# Patient Record
Sex: FEMALE | Race: WHITE | Employment: PART TIME | URBAN - METROPOLITAN AREA
[De-identification: names, ages, dates, MRNs, and addresses within clinical notes are randomized per-mention and may not be internally consistent; named-entity substitution may affect disease eponyms.]

---

## 2017-07-24 ENCOUNTER — HOSPITAL ENCOUNTER (OUTPATIENT)
Age: 37
Discharge: HOME | End: 2017-07-24
Payer: COMMERCIAL

## 2017-07-24 ENCOUNTER — APPOINTMENT (OUTPATIENT)
Dept: GENERAL RADIOLOGY | Age: 37
End: 2017-07-24
Attending: NURSE PRACTITIONER
Payer: COMMERCIAL

## 2017-07-24 VITALS
RESPIRATION RATE: 16 BRPM | OXYGEN SATURATION: 98 % | DIASTOLIC BLOOD PRESSURE: 72 MMHG | HEART RATE: 89 BPM | SYSTOLIC BLOOD PRESSURE: 129 MMHG | TEMPERATURE: 98 F | WEIGHT: 220 LBS | BODY MASS INDEX: 35.36 KG/M2 | HEIGHT: 66 IN

## 2017-07-24 DIAGNOSIS — S66.912A WRIST STRAIN, LEFT, INITIAL ENCOUNTER: Primary | ICD-10-CM

## 2017-07-24 PROCEDURE — 99213 OFFICE O/P EST LOW 20 MIN: CPT

## 2017-07-24 PROCEDURE — 73110 X-RAY EXAM OF WRIST: CPT | Performed by: NURSE PRACTITIONER

## 2017-07-24 NOTE — ED PROVIDER NOTES
Patient presents with:  Wrist Pain      HPI:     Jerri Khan is a 43year old female who presents today with a chief complaint of pain in the left wrist for the past few weeks.   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 No snuffbox tenderness.   Point Tenderness: No    BP (!) 132/104   Pulse 89   Temp 98.4 °F (36.9 °C) (Oral)   Resp 16   Ht 167.6 cm (5' 6\")   Wt 99.8 kg   LMP 07/05/2017 (Exact Date)   SpO2 98%   BMI 35.51 kg/m²   GENERAL: well developed, well n discharge instructions for your condition today.     Follow Up with:  Mckenzie Montez MD  [address] [address]  [phone]    Schedule an appointment as soon as possible for a visit in 2 days      Chrissie Reza MD [place]

## 2017-07-24 NOTE — ED INITIAL ASSESSMENT (HPI)
PATIENT ARRIVED AMBULATORY TO ROOM B C/O LEFT WRIST AND LEFT HAND PAIN X2 WEEKS. PAIN RADIATES UP THE LEFT FOREARM. PATIENT DENIES ACUTE INJURY.  PATIENT STATES \"MY WRIST HAS BEEN MESSED UP FOR YEARS WHEN I MOVE IT I CAN HEAR CRACKING\" +TINGLING TO FINGER

## 2017-08-22 ENCOUNTER — HOSPITAL ENCOUNTER (OUTPATIENT)
Age: 37
Discharge: HOME | End: 2017-08-22
Attending: EMERGENCY MEDICINE
Payer: COMMERCIAL

## 2017-08-22 VITALS
OXYGEN SATURATION: 99 % | SYSTOLIC BLOOD PRESSURE: 142 MMHG | RESPIRATION RATE: 16 BRPM | BODY MASS INDEX: 38.57 KG/M2 | HEART RATE: 79 BPM | HEIGHT: 66 IN | DIASTOLIC BLOOD PRESSURE: 86 MMHG | TEMPERATURE: 98 F | WEIGHT: 240 LBS

## 2017-08-22 DIAGNOSIS — H61.21 IMPACTED CERUMEN OF RIGHT EAR: Primary | ICD-10-CM

## 2017-08-22 PROCEDURE — 69209 REMOVE IMPACTED EAR WAX UNI: CPT

## 2017-08-22 PROCEDURE — 99212 OFFICE O/P EST SF 10 MIN: CPT

## 2017-08-22 PROCEDURE — 99213 OFFICE O/P EST LOW 20 MIN: CPT

## 2017-08-22 NOTE — ED PROVIDER NOTES
Patient Seen in: [facility]    History   Patient presents with:  Ear Problem Pain (neurosensory)    Stated Complaint: Rt ear pain    HPI    Patient is a 25-year-old female with a history of cerumen impactions complains of Smokeless tobacco: Never Used                      Alcohol use: No               Comment: None. Review of Systems    Positive for stated complaint: Rt ear pain  Other systems are as noted in HPI.   Constitutional and vital sign

## 2017-08-22 NOTE — ED INITIAL ASSESSMENT (HPI)
C/O RIGHT EAR DISCOMFORT X 1 WEEK. PATIENT REPORTS HISTORY OF EAR WAX BUILDUP IN THE PAST. DENIES PAIN BUT STATES SHE IS \"UNCOMFORTABLE\" AND HAS MUFFLED HEARING.

## 2018-06-22 ENCOUNTER — APPOINTMENT (OUTPATIENT)
Dept: GENERAL RADIOLOGY | Age: 38
End: 2018-06-22
Attending: NURSE PRACTITIONER
Payer: MEDICAID

## 2018-06-22 ENCOUNTER — HOSPITAL ENCOUNTER (OUTPATIENT)
Age: 38
Discharge: HOME | End: 2018-06-22
Payer: MEDICAID

## 2018-06-22 VITALS
BODY MASS INDEX: 35.36 KG/M2 | OXYGEN SATURATION: 100 % | DIASTOLIC BLOOD PRESSURE: 99 MMHG | HEART RATE: 81 BPM | TEMPERATURE: 97 F | RESPIRATION RATE: 20 BRPM | HEIGHT: 66 IN | WEIGHT: 220 LBS | SYSTOLIC BLOOD PRESSURE: 141 MMHG

## 2018-06-22 DIAGNOSIS — J40 BRONCHITIS: Primary | ICD-10-CM

## 2018-06-22 PROCEDURE — 71046 X-RAY EXAM CHEST 2 VIEWS: CPT | Performed by: NURSE PRACTITIONER

## 2018-06-22 PROCEDURE — 99214 OFFICE O/P EST MOD 30 MIN: CPT

## 2018-06-22 PROCEDURE — 99213 OFFICE O/P EST LOW 20 MIN: CPT

## 2018-06-22 RX ORDER — ALBUTEROL SULFATE 90 UG/1
2 AEROSOL, METERED RESPIRATORY (INHALATION) EVERY 4 HOURS PRN
Qty: 1 INHALER | Refills: 0 | Status: SHIPPED | OUTPATIENT
Start: 2018-06-22 | End: 2018-07-22

## 2018-06-22 RX ORDER — AZITHROMYCIN 250 MG/1
TABLET, FILM COATED ORAL
Qty: 1 PACKAGE | Refills: 0 | Status: SHIPPED | OUTPATIENT
Start: 2018-06-22 | End: 2018-06-27

## 2018-06-22 RX ORDER — AZITHROMYCIN 250 MG/1
TABLET, FILM COATED ORAL
Qty: 1 PACKAGE | Refills: 0 | Status: SHIPPED | OUTPATIENT
Start: 2018-06-22 | End: 2018-06-22

## 2018-06-22 RX ORDER — PREDNISONE 20 MG/1
40 TABLET ORAL DAILY
Qty: 10 TABLET | Refills: 0 | Status: SHIPPED | OUTPATIENT
Start: 2018-06-22 | End: 2018-06-27

## 2018-06-22 NOTE — ED PROVIDER NOTES
Patient presents with:  Cough/URI      HPI:     Sandrita Mcnulty is a 42year old female with no significant past medical history presents with cough and chest tightness. Pt reports symptoms started last night but worsened today.  Reports she only has chest Ann Miguel MD on 6/22/2018 at 12:26     Approved by (CST): Ann Miguel MD on 6/22/2018 at 12:28          Xr Chest Pa + Lat Chest (cpt=71046)    Result Date: 6/22/2018  CONCLUSION:  1. No focal airspace disease or pleural effusion.  2. Mild left per instructions for your condition today.     Follow Up with:  Keven Sheriff MD  [address] [address]  [phone]

## 2018-06-25 ENCOUNTER — OFFICE VISIT (OUTPATIENT)
Dept: INTERNAL MEDICINE CLINIC | Facility: CLINIC | Age: 38
End: 2018-06-25

## 2018-06-25 VITALS
HEART RATE: 80 BPM | SYSTOLIC BLOOD PRESSURE: 152 MMHG | BODY MASS INDEX: 44.74 KG/M2 | RESPIRATION RATE: 20 BRPM | DIASTOLIC BLOOD PRESSURE: 91 MMHG | WEIGHT: 278.38 LBS | OXYGEN SATURATION: 97 % | HEIGHT: 66 IN | TEMPERATURE: 98 F

## 2018-06-25 DIAGNOSIS — F41.9 ANXIETY: ICD-10-CM

## 2018-06-25 DIAGNOSIS — J40 BRONCHITIS: ICD-10-CM

## 2018-06-25 PROCEDURE — 99214 OFFICE O/P EST MOD 30 MIN: CPT | Performed by: INTERNAL MEDICINE

## 2018-06-25 PROCEDURE — 99212 OFFICE O/P EST SF 10 MIN: CPT | Performed by: INTERNAL MEDICINE

## 2018-06-25 RX ORDER — LISINOPRIL 5 MG/1
5 TABLET ORAL DAILY
Qty: 30 TABLET | Refills: 1 | Status: SHIPPED | OUTPATIENT
Start: 2018-06-25 | End: 2019-08-27

## 2018-06-25 RX ORDER — BLOOD-GLUCOSE METER
EACH MISCELLANEOUS
Qty: 1 KIT | Refills: 0 | Status: SHIPPED | OUTPATIENT
Start: 2018-06-25 | End: 2021-12-16 | Stop reason: ALTCHOICE

## 2018-06-25 RX ORDER — LANCETS 33 GAUGE
EACH MISCELLANEOUS
Qty: 100 EACH | Refills: 6 | Status: SHIPPED | OUTPATIENT
Start: 2018-06-25 | End: 2021-12-16 | Stop reason: ALTCHOICE

## 2018-06-25 RX ORDER — PREDNISONE 10 MG/1
TABLET ORAL
Qty: 9 TABLET | Refills: 0 | Status: SHIPPED | OUTPATIENT
Start: 2018-06-25 | End: 2019-08-27

## 2018-06-26 ENCOUNTER — TELEPHONE (OUTPATIENT)
Dept: INTERNAL MEDICINE CLINIC | Facility: CLINIC | Age: 38
End: 2018-06-26

## 2018-06-26 RX ORDER — CLONAZEPAM 0.5 MG/1
0.5 TABLET ORAL NIGHTLY PRN
Qty: 30 TABLET | Refills: 0 | OUTPATIENT
Start: 2018-06-26 | End: 2019-08-27

## 2018-06-26 NOTE — PROGRESS NOTES
HPI:    Patient ID: Richie George is a 42year old female.   Presents for follow-up of bronchitis    HPI  Patient reports that several days ago she was seen at [facility] care center was diagnosed with bronchitis, given prednisone, Zithromax, and albutero and psychiatric symptoms          Current Outpatient Prescriptions:  lisinopril 5 MG Oral Tab Take 1 tablet (5 mg total) by mouth daily.  Disp: 30 tablet Rfl: 1   Blood Glucose Monitoring Suppl (ONETOUCH ULTRA 2) w/Device Does not apply Kit Check  Blood sug Exam    Constitutional: She is oriented to person, place, and time and obese. She appears well-developed and well-nourished. No distress. HENT:   Head: Normocephalic and atraumatic.    Eyes: Conjunctivae and EOM are normal. Pupils are equal, round, and re

## 2018-07-31 ENCOUNTER — LAB ENCOUNTER (OUTPATIENT)
Dept: LAB | Age: 38
End: 2018-07-31
Attending: INTERNAL MEDICINE
Payer: MEDICAID

## 2018-07-31 DIAGNOSIS — F41.9 ANXIETY: ICD-10-CM

## 2018-07-31 DIAGNOSIS — J40 BRONCHITIS: ICD-10-CM

## 2018-07-31 LAB
ALBUMIN SERPL BCP-MCNC: 3.5 G/DL (ref 3.5–4.8)
ALBUMIN/GLOB SERPL: 1 {RATIO} (ref 1–2)
ALP SERPL-CCNC: 91 U/L (ref 32–100)
ALT SERPL-CCNC: 41 U/L (ref 14–54)
ANION GAP SERPL CALC-SCNC: 6 MMOL/L (ref 0–18)
AST SERPL-CCNC: 30 U/L (ref 15–41)
BASOPHILS # BLD: 0.1 K/UL (ref 0–0.2)
BASOPHILS NFR BLD: 1 %
BILIRUB SERPL-MCNC: 0.9 MG/DL (ref 0.3–1.2)
BUN SERPL-MCNC: 8 MG/DL (ref 8–20)
BUN/CREAT SERPL: 11.6 (ref 10–20)
CALCIUM SERPL-MCNC: 8.9 MG/DL (ref 8.5–10.5)
CHLORIDE SERPL-SCNC: 105 MMOL/L (ref 95–110)
CHOLEST SERPL-MCNC: 180 MG/DL (ref 110–200)
CO2 SERPL-SCNC: 27 MMOL/L (ref 22–32)
CREAT SERPL-MCNC: 0.69 MG/DL (ref 0.5–1.5)
EOSINOPHIL # BLD: 0.1 K/UL (ref 0–0.7)
EOSINOPHIL NFR BLD: 1 %
ERYTHROCYTE [DISTWIDTH] IN BLOOD BY AUTOMATED COUNT: 14.3 % (ref 11–15)
GLOBULIN PLAS-MCNC: 3.6 G/DL (ref 2.5–3.7)
GLUCOSE SERPL-MCNC: 106 MG/DL (ref 70–99)
HCT VFR BLD AUTO: 40.4 % (ref 35–48)
HDLC SERPL-MCNC: 38 MG/DL
HGB BLD-MCNC: 13.5 G/DL (ref 12–16)
LDLC SERPL CALC-MCNC: 119 MG/DL (ref 0–99)
LYMPHOCYTES # BLD: 2.7 K/UL (ref 1–4)
LYMPHOCYTES NFR BLD: 23 %
MCH RBC QN AUTO: 27.4 PG (ref 27–32)
MCHC RBC AUTO-ENTMCNC: 33.3 G/DL (ref 32–37)
MCV RBC AUTO: 82.2 FL (ref 80–100)
MONOCYTES # BLD: 0.6 K/UL (ref 0–1)
MONOCYTES NFR BLD: 5 %
NEUTROPHILS # BLD AUTO: 8.6 K/UL (ref 1.8–7.7)
NEUTROPHILS NFR BLD: 71 %
NONHDLC SERPL-MCNC: 142 MG/DL
OSMOLALITY UR CALC.SUM OF ELEC: 285 MOSM/KG (ref 275–295)
PATIENT FASTING: YES
PLATELET # BLD AUTO: 329 K/UL (ref 140–400)
PMV BLD AUTO: 8.1 FL (ref 7.4–10.3)
POTASSIUM SERPL-SCNC: 4.2 MMOL/L (ref 3.3–5.1)
PROT SERPL-MCNC: 7.1 G/DL (ref 5.9–8.4)
RBC # BLD AUTO: 4.91 M/UL (ref 3.7–5.4)
SODIUM SERPL-SCNC: 138 MMOL/L (ref 136–144)
TRIGL SERPL-MCNC: 116 MG/DL (ref 1–149)
TSH SERPL-ACNC: 2.41 UIU/ML (ref 0.45–5.33)
WBC # BLD AUTO: 12.2 K/UL (ref 4–11)

## 2018-07-31 PROCEDURE — 84443 ASSAY THYROID STIM HORMONE: CPT

## 2018-07-31 PROCEDURE — 80061 LIPID PANEL: CPT

## 2018-07-31 PROCEDURE — 85025 COMPLETE CBC W/AUTO DIFF WBC: CPT

## 2018-07-31 PROCEDURE — 36415 COLL VENOUS BLD VENIPUNCTURE: CPT

## 2018-07-31 PROCEDURE — 80053 COMPREHEN METABOLIC PANEL: CPT

## 2018-07-31 PROCEDURE — 83036 HEMOGLOBIN GLYCOSYLATED A1C: CPT

## 2018-08-01 LAB — HBA1C MFR BLD: 6.2 % (ref 4–6)

## 2018-08-04 ENCOUNTER — TELEPHONE (OUTPATIENT)
Dept: INTERNAL MEDICINE CLINIC | Facility: CLINIC | Age: 38
End: 2018-08-04

## 2018-08-04 NOTE — TELEPHONE ENCOUNTER
Reviewed with patient recent lab test results, slightly elevated hemoglobin A1c 6.2, strongly encourage her to follow low carbohydrate diet weight down, regular physical activities encouraged.   Normal kidney liver function tests, elevated white blood cell

## 2018-08-31 ENCOUNTER — CHARTING TRANS (OUTPATIENT)
Dept: OTHER | Age: 38
End: 2018-08-31

## 2018-08-31 ASSESSMENT — PAIN SCALES - GENERAL: PAINLEVEL_OUTOF10: 0

## 2018-12-08 VITALS
BODY MASS INDEX: 46.02 KG/M2 | SYSTOLIC BLOOD PRESSURE: 118 MMHG | HEART RATE: 86 BPM | WEIGHT: 276.25 LBS | OXYGEN SATURATION: 97 % | RESPIRATION RATE: 18 BRPM | DIASTOLIC BLOOD PRESSURE: 66 MMHG | HEIGHT: 65 IN

## 2019-03-11 ENCOUNTER — OFFICE VISIT (OUTPATIENT)
Dept: FAMILY MEDICINE | Age: 39
End: 2019-03-11

## 2019-03-11 ENCOUNTER — TELEPHONE (OUTPATIENT)
Dept: SCHEDULING | Age: 39
End: 2019-03-11

## 2019-03-11 DIAGNOSIS — J02.9 SORE THROAT: Primary | ICD-10-CM

## 2019-03-11 DIAGNOSIS — J06.9 ACUTE URI: ICD-10-CM

## 2019-03-11 PROBLEM — I10 ESSENTIAL HYPERTENSION: Status: ACTIVE | Noted: 2019-03-11

## 2019-03-11 PROBLEM — E66.01 MORBID OBESITY WITH BMI OF 45.0-49.9, ADULT (CMD): Status: ACTIVE | Noted: 2019-03-11

## 2019-03-11 PROBLEM — R73.03 PREDIABETES: Status: ACTIVE | Noted: 2019-03-11

## 2019-03-11 LAB
INTERNAL PROCEDURAL CONTROLS ACCEPTABLE: YES
S PYO AG THROAT QL IA.RAPID: NEGATIVE

## 2019-03-11 PROCEDURE — 87880 STREP A ASSAY W/OPTIC: CPT | Performed by: PHYSICIAN ASSISTANT

## 2019-03-11 PROCEDURE — 3074F SYST BP LT 130 MM HG: CPT | Performed by: PHYSICIAN ASSISTANT

## 2019-03-11 PROCEDURE — 99213 OFFICE O/P EST LOW 20 MIN: CPT | Performed by: PHYSICIAN ASSISTANT

## 2019-03-11 PROCEDURE — 3079F DIAST BP 80-89 MM HG: CPT | Performed by: PHYSICIAN ASSISTANT

## 2019-03-11 RX ORDER — LISINOPRIL 5 MG/1
5 TABLET ORAL DAILY
COMMUNITY

## 2019-03-11 ASSESSMENT — ENCOUNTER SYMPTOMS
SORE THROAT: 1
COUGH: 1
SHORTNESS OF BREATH: 0

## 2019-03-11 ASSESSMENT — PAIN SCALES - GENERAL: PAINLEVEL: 7-8

## 2019-08-27 ENCOUNTER — OFFICE VISIT (OUTPATIENT)
Dept: INTERNAL MEDICINE CLINIC | Facility: CLINIC | Age: 39
End: 2019-08-27
Payer: MEDICAID

## 2019-08-27 VITALS
SYSTOLIC BLOOD PRESSURE: 146 MMHG | HEIGHT: 66 IN | HEART RATE: 87 BPM | DIASTOLIC BLOOD PRESSURE: 79 MMHG | BODY MASS INDEX: 43.39 KG/M2 | RESPIRATION RATE: 22 BRPM | WEIGHT: 270 LBS

## 2019-08-27 DIAGNOSIS — R73.03 PREDIABETES: ICD-10-CM

## 2019-08-27 DIAGNOSIS — F41.9 ANXIETY: ICD-10-CM

## 2019-08-27 DIAGNOSIS — I10 ESSENTIAL HYPERTENSION: Primary | ICD-10-CM

## 2019-08-27 PROCEDURE — 99214 OFFICE O/P EST MOD 30 MIN: CPT | Performed by: INTERNAL MEDICINE

## 2019-08-27 RX ORDER — ESCITALOPRAM OXALATE 10 MG/1
10 TABLET ORAL DAILY
Qty: 30 TABLET | Refills: 1 | Status: SHIPPED | OUTPATIENT
Start: 2019-08-27 | End: 2020-02-29

## 2019-08-27 RX ORDER — LABETALOL 200 MG/1
200 TABLET, FILM COATED ORAL 2 TIMES DAILY
Qty: 180 TABLET | Refills: 1 | Status: SHIPPED | OUTPATIENT
Start: 2019-08-27 | End: 2019-11-13

## 2019-08-27 RX ORDER — ALPRAZOLAM 0.5 MG/1
0.5 TABLET ORAL NIGHTLY PRN
Qty: 30 TABLET | Refills: 0 | Status: SHIPPED | OUTPATIENT
Start: 2019-08-27 | End: 2019-11-13

## 2019-08-27 NOTE — PROGRESS NOTES
HPI:    Patient ID: Bear Molina is a 34year old female. Presents for follow-up of hypertension, anxiety    HPI  Patient states that she has been taking milligrams twice a day most of this regularly, she missed dose less like.   Lately she reports m symptoms  Neurological:  Negative for gait disturbance, paresthesias  Psychiatric:  Negative for inappropriate interaction and psychiatric symptoms        Current Outpatient Medications:  ALPRAZolam (XANAX) 0.5 MG Oral Tab Take 1 tablet (0.5 mg total) by m rhythm and normal heart sounds. No murmur heard. Edema not present. Pulmonary/Chest: Effort normal. No respiratory distress. She has no wheezes. She has no rales. Abdominal: Soft. She exhibits no distension. There is no hepatosplenomegaly.  There is

## 2019-09-01 PROBLEM — E66.813 CLASS 3 SEVERE OBESITY DUE TO EXCESS CALORIES WITHOUT SERIOUS COMORBIDITY IN ADULT: Status: ACTIVE | Noted: 2019-09-01

## 2019-09-01 PROBLEM — E66.01 CLASS 3 SEVERE OBESITY DUE TO EXCESS CALORIES WITHOUT SERIOUS COMORBIDITY IN ADULT (HCC): Status: ACTIVE | Noted: 2019-09-01

## 2019-09-01 PROBLEM — E66.813 CLASS 3 SEVERE OBESITY DUE TO EXCESS CALORIES WITHOUT SERIOUS COMORBIDITY IN ADULT (HCC): Status: ACTIVE | Noted: 2019-09-01

## 2019-09-01 PROBLEM — F41.9 ANXIETY: Status: ACTIVE | Noted: 2019-09-01

## 2019-10-26 ENCOUNTER — LAB ENCOUNTER (OUTPATIENT)
Dept: LAB | Age: 39
End: 2019-10-26
Attending: INTERNAL MEDICINE
Payer: MEDICAID

## 2019-10-26 DIAGNOSIS — R73.03 PREDIABETES: ICD-10-CM

## 2019-10-26 DIAGNOSIS — I10 ESSENTIAL HYPERTENSION: ICD-10-CM

## 2019-10-26 PROCEDURE — 85025 COMPLETE CBC W/AUTO DIFF WBC: CPT

## 2019-10-26 PROCEDURE — 36415 COLL VENOUS BLD VENIPUNCTURE: CPT

## 2019-10-26 PROCEDURE — 84439 ASSAY OF FREE THYROXINE: CPT

## 2019-10-26 PROCEDURE — 84443 ASSAY THYROID STIM HORMONE: CPT

## 2019-10-26 PROCEDURE — 80061 LIPID PANEL: CPT

## 2019-10-26 PROCEDURE — 80053 COMPREHEN METABOLIC PANEL: CPT

## 2019-10-26 PROCEDURE — 83036 HEMOGLOBIN GLYCOSYLATED A1C: CPT

## 2019-10-31 ENCOUNTER — TELEPHONE (OUTPATIENT)
Dept: INTERNAL MEDICINE CLINIC | Facility: CLINIC | Age: 39
End: 2019-10-31

## 2019-11-12 NOTE — TELEPHONE ENCOUNTER
Patient returned call. She has an appointment with Dr. Nora Russell tomorrow 11/13 and will discuss everything in office.

## 2019-11-13 ENCOUNTER — OFFICE VISIT (OUTPATIENT)
Dept: INTERNAL MEDICINE CLINIC | Facility: CLINIC | Age: 39
End: 2019-11-13

## 2019-11-13 VITALS
TEMPERATURE: 98 F | DIASTOLIC BLOOD PRESSURE: 98 MMHG | WEIGHT: 268.38 LBS | BODY MASS INDEX: 43.13 KG/M2 | HEART RATE: 80 BPM | HEIGHT: 66 IN | SYSTOLIC BLOOD PRESSURE: 158 MMHG | RESPIRATION RATE: 20 BRPM

## 2019-11-13 DIAGNOSIS — F41.9 ANXIETY: ICD-10-CM

## 2019-11-13 DIAGNOSIS — E03.8 OTHER SPECIFIED HYPOTHYROIDISM: Primary | ICD-10-CM

## 2019-11-13 PROCEDURE — 99214 OFFICE O/P EST MOD 30 MIN: CPT | Performed by: INTERNAL MEDICINE

## 2019-11-13 RX ORDER — ALPRAZOLAM 0.5 MG/1
0.5 TABLET ORAL NIGHTLY PRN
Qty: 30 TABLET | Refills: 1 | Status: SHIPPED | OUTPATIENT
Start: 2019-11-13 | End: 2020-02-29

## 2019-11-13 RX ORDER — ESCITALOPRAM OXALATE 20 MG/1
20 TABLET ORAL DAILY
Qty: 30 TABLET | Refills: 1 | Status: SHIPPED | OUTPATIENT
Start: 2019-11-13 | End: 2021-06-08 | Stop reason: ALTCHOICE

## 2019-11-13 RX ORDER — LEVOTHYROXINE SODIUM 0.03 MG/1
25 TABLET ORAL
Qty: 30 TABLET | Refills: 1 | Status: SHIPPED | OUTPATIENT
Start: 2019-11-13 | End: 2020-02-13

## 2019-11-13 RX ORDER — LISINOPRIL 5 MG/1
5 TABLET ORAL DAILY
Qty: 30 TABLET | Refills: 3 | Status: SHIPPED | OUTPATIENT
Start: 2019-11-13 | End: 2020-02-12

## 2019-11-13 RX ORDER — LABETALOL 200 MG/1
200 TABLET, FILM COATED ORAL 2 TIMES DAILY
Qty: 180 TABLET | Refills: 1 | Status: SHIPPED | OUTPATIENT
Start: 2019-11-13 | End: 2020-08-30

## 2019-11-13 RX ORDER — ESCITALOPRAM OXALATE 10 MG/1
10 TABLET ORAL DAILY
Qty: 30 TABLET | Refills: 1 | Status: CANCELLED | OUTPATIENT
Start: 2019-11-13

## 2019-11-15 NOTE — PROGRESS NOTES
HPI:    Patient ID: Kamryn Oneil is a 37year old female. Presents for follow-up on depression, hypertension, abnormal labs.     HPI  Patient was started on Lexapro 10 mg daily about 2 months ago reports that she is feeling somewhat better, less depr Does not apply Kit Check  Blood sugar daily 1 kit 0   • Glucose Blood (ONETOUCH ULTRA BLUE) In Vitro Strip Check  Blood  Sugar daily 100 strip 6   • ONETOUCH DELICA LANCETS 75Z Does not apply Misc cjeck  Blood sugar daily 100 each 6   • ibuprofen (MOTRIN) lisinopril 5 mg daily, continue labetalol 200 mg twice a day follow-up in 6 weeks  Anxiety patient will increase Lexapro take 20 mg daily, alprazolam as needed patient states that barely uses it follow-up in 6 to 8 weeks  Prior diabetes referred patient to

## 2020-01-04 ENCOUNTER — HOSPITAL ENCOUNTER (OUTPATIENT)
Age: 40
Discharge: HOME | End: 2020-01-04
Attending: EMERGENCY MEDICINE
Payer: MEDICAID

## 2020-01-04 VITALS
SYSTOLIC BLOOD PRESSURE: 130 MMHG | OXYGEN SATURATION: 98 % | DIASTOLIC BLOOD PRESSURE: 76 MMHG | TEMPERATURE: 98 F | BODY MASS INDEX: 42.75 KG/M2 | WEIGHT: 266 LBS | RESPIRATION RATE: 18 BRPM | HEART RATE: 71 BPM | HEIGHT: 66 IN

## 2020-01-04 DIAGNOSIS — J40 BRONCHITIS: Primary | ICD-10-CM

## 2020-01-04 PROCEDURE — 99214 OFFICE O/P EST MOD 30 MIN: CPT

## 2020-01-04 PROCEDURE — 99213 OFFICE O/P EST LOW 20 MIN: CPT

## 2020-01-04 NOTE — ED INITIAL ASSESSMENT (HPI)
PATIENT REPORTS URI SYMPTOMS FOR THE PAST 4 DAYS.  + COUGH DENIES FEVERS. STATES COUGH IS PRODUCTIVE. REPORTS HX OF BRONCHITIS.

## 2020-01-04 NOTE — ED PROVIDER NOTES
Patient Seen in: [facility]      History   Patient presents with:  Cough/URI    Stated Complaint: cough    HPI    13-year-old female presents for evaluation of cough.   Patient reports for the past 4 days she has had rhino Mouth: Mucous membranes are moist.   Eyes:      Conjunctiva/sclera: Conjunctivae normal.   Neck:      Musculoskeletal: Normal range of motion and neck supple. No neck rigidity. Cardiovascular:      Rate and Rhythm: Normal rate.    Pulmonary:      Effort:

## 2020-02-12 RX ORDER — LISINOPRIL 5 MG/1
TABLET ORAL
Qty: 90 TABLET | Refills: 1 | Status: SHIPPED | OUTPATIENT
Start: 2020-02-12 | End: 2021-05-19

## 2020-02-12 NOTE — TELEPHONE ENCOUNTER
Hypertensive Medications  Refill passed per [facility], [facility] protocol.     Protocol Criteria:  · Appointment scheduled in the past 6 months or in the next 3 months  · BMP or CMP in the past 12 months  · Creatinine result < 2  Recent Outpatient Visits

## 2020-02-12 NOTE — TELEPHONE ENCOUNTER
Hypothyroid Medications  Please review; protocol failed.     Protocol Criteria:  Appointment scheduled in the past 12 months or the next 3 months  TSH resulted in the past 12 months that is normal  Recent Outpatient Visits            3 months ago Other spec

## 2020-02-13 RX ORDER — LEVOTHYROXINE SODIUM 0.03 MG/1
TABLET ORAL
Qty: 30 TABLET | Refills: 0 | Status: SHIPPED | OUTPATIENT
Start: 2020-02-13 | End: 2020-02-29

## 2020-02-27 ENCOUNTER — APPOINTMENT (OUTPATIENT)
Dept: LAB | Age: 40
End: 2020-02-27
Attending: INTERNAL MEDICINE
Payer: MEDICAID

## 2020-02-27 DIAGNOSIS — E03.8 OTHER SPECIFIED HYPOTHYROIDISM: ICD-10-CM

## 2020-02-27 LAB
ALBUMIN SERPL-MCNC: 3.5 G/DL (ref 3.4–5)
ALBUMIN/GLOB SERPL: 0.9 {RATIO} (ref 1–2)
ALP LIVER SERPL-CCNC: 92 U/L (ref 37–98)
ALT SERPL-CCNC: 24 U/L (ref 13–56)
ANION GAP SERPL CALC-SCNC: 4 MMOL/L (ref 0–18)
AST SERPL-CCNC: 21 U/L (ref 15–37)
BILIRUB SERPL-MCNC: 0.9 MG/DL (ref 0.1–2)
BUN BLD-MCNC: 9 MG/DL (ref 7–18)
BUN/CREAT SERPL: 14.3 (ref 10–20)
CALCIUM BLD-MCNC: 8.7 MG/DL (ref 8.5–10.1)
CHLORIDE SERPL-SCNC: 106 MMOL/L (ref 98–112)
CO2 SERPL-SCNC: 29 MMOL/L (ref 21–32)
CREAT BLD-MCNC: 0.63 MG/DL (ref 0.55–1.02)
GLOBULIN PLAS-MCNC: 4.1 G/DL (ref 2.8–4.4)
GLUCOSE BLD-MCNC: 90 MG/DL (ref 70–99)
M PROTEIN MFR SERPL ELPH: 7.6 G/DL (ref 6.4–8.2)
OSMOLALITY SERPL CALC.SUM OF ELEC: 286 MOSM/KG (ref 275–295)
PATIENT FASTING Y/N/NP: YES
POTASSIUM SERPL-SCNC: 3.9 MMOL/L (ref 3.5–5.1)
SODIUM SERPL-SCNC: 139 MMOL/L (ref 136–145)
TSI SER-ACNC: 2.15 MIU/ML (ref 0.36–3.74)

## 2020-02-27 PROCEDURE — 36415 COLL VENOUS BLD VENIPUNCTURE: CPT

## 2020-02-27 PROCEDURE — 84443 ASSAY THYROID STIM HORMONE: CPT

## 2020-02-27 PROCEDURE — 80053 COMPREHEN METABOLIC PANEL: CPT

## 2020-02-29 ENCOUNTER — OFFICE VISIT (OUTPATIENT)
Dept: INTERNAL MEDICINE CLINIC | Facility: CLINIC | Age: 40
End: 2020-02-29
Payer: MEDICAID

## 2020-02-29 VITALS
HEIGHT: 66 IN | TEMPERATURE: 97 F | BODY MASS INDEX: 42.27 KG/M2 | WEIGHT: 263 LBS | HEART RATE: 77 BPM | DIASTOLIC BLOOD PRESSURE: 74 MMHG | SYSTOLIC BLOOD PRESSURE: 128 MMHG

## 2020-02-29 DIAGNOSIS — E03.8 OTHER SPECIFIED HYPOTHYROIDISM: Primary | ICD-10-CM

## 2020-02-29 DIAGNOSIS — I10 ESSENTIAL HYPERTENSION: ICD-10-CM

## 2020-02-29 DIAGNOSIS — R73.03 PREDIABETES: ICD-10-CM

## 2020-02-29 PROCEDURE — 99214 OFFICE O/P EST MOD 30 MIN: CPT | Performed by: INTERNAL MEDICINE

## 2020-02-29 RX ORDER — LEVOTHYROXINE SODIUM 0.03 MG/1
TABLET ORAL
Qty: 30 TABLET | Refills: 5 | Status: SHIPPED | OUTPATIENT
Start: 2020-02-29 | End: 2021-06-08 | Stop reason: ALTCHOICE

## 2020-02-29 RX ORDER — INSULIN LISPRO 100 [IU]/ML
INJECTION, SOLUTION INTRAVENOUS; SUBCUTANEOUS
COMMUNITY
Start: 2015-01-30 | End: 2020-08-30

## 2020-02-29 RX ORDER — ALPRAZOLAM 0.5 MG/1
0.5 TABLET ORAL NIGHTLY PRN
Qty: 30 TABLET | Refills: 1 | Status: SHIPPED | OUTPATIENT
Start: 2020-02-29 | End: 2020-05-04

## 2020-03-01 NOTE — PROGRESS NOTES
HPI:    Patient ID: Eunice Rose is a 37year old female. Presents for follow-up on hypertension, hypothyroidism, prediabetes, anxiety.     HPI  Patient reports that she is trying to make changes in her diet, eliminated pop, watches carbohydrate [place] Blood  Sugar daily 100 strip 6   • ONETOUCH DELICA LANCETS 60W Does not apply Misc cjeck  Blood sugar daily 100 each 6   • ibuprofen (MOTRIN) 600 MG Oral Tab Take 1 tablet by mouth every 8 (eight) hours as needed for Pain.  40 tablet 0   • Blood Pressure Mo To Free T4 [E]      Meds This.:  Requested Prescriptions     Signed Prescriptions Disp Refills   • Levothyroxine Sodium 25 MCG Oral Tab 30 tablet 5     Sig: TAKE ONE TABLET BY MOUTH BEFORE BREAKFAST   • ALPRAZolam (XANAX) 0.5 MG Oral Tab 30 tablet 1     Si

## 2020-05-04 RX ORDER — ALPRAZOLAM 0.5 MG/1
TABLET ORAL
Qty: 30 TABLET | Refills: 0 | Status: SHIPPED | OUTPATIENT
Start: 2020-05-04 | End: 2020-06-19

## 2020-06-19 ENCOUNTER — TELEPHONE (OUTPATIENT)
Dept: INTERNAL MEDICINE CLINIC | Facility: CLINIC | Age: 40
End: 2020-06-19

## 2020-06-19 RX ORDER — ALPRAZOLAM 0.5 MG/1
TABLET ORAL
Qty: 30 TABLET | Refills: 0 | Status: SHIPPED | OUTPATIENT
Start: 2020-06-19 | End: 2020-08-26

## 2020-06-19 NOTE — TELEPHONE ENCOUNTER
1st Attempt - Patient states she will complete blood work first and call back to schedule appointment with Dr. Dany Smith.

## 2020-06-19 NOTE — TELEPHONE ENCOUNTER
Please call patient I refilled her medication she is due for follow-up visit, blood test orders in computer

## 2020-06-20 ENCOUNTER — APPOINTMENT (OUTPATIENT)
Dept: LAB | Age: 40
End: 2020-06-20
Attending: INTERNAL MEDICINE
Payer: MEDICAID

## 2020-06-20 DIAGNOSIS — R73.03 PREDIABETES: ICD-10-CM

## 2020-06-20 DIAGNOSIS — E03.8 OTHER SPECIFIED HYPOTHYROIDISM: ICD-10-CM

## 2020-06-20 PROCEDURE — 36415 COLL VENOUS BLD VENIPUNCTURE: CPT

## 2020-06-20 PROCEDURE — 84443 ASSAY THYROID STIM HORMONE: CPT

## 2020-06-20 PROCEDURE — 80053 COMPREHEN METABOLIC PANEL: CPT

## 2020-06-20 PROCEDURE — 83036 HEMOGLOBIN GLYCOSYLATED A1C: CPT

## 2020-07-14 ENCOUNTER — TELEPHONE (OUTPATIENT)
Dept: INTERNAL MEDICINE CLINIC | Facility: CLINIC | Age: 40
End: 2020-07-14

## 2020-07-15 NOTE — TELEPHONE ENCOUNTER
Left message to pt. DR. Jason Villalta still seeing the last pt. Will be calling in 30 minutes for video visit.

## 2020-08-26 RX ORDER — ALPRAZOLAM 0.5 MG/1
TABLET ORAL
Qty: 30 TABLET | Refills: 0 | Status: SHIPPED | OUTPATIENT
Start: 2020-08-26 | End: 2020-10-07

## 2020-08-29 ENCOUNTER — OFFICE VISIT (OUTPATIENT)
Dept: INTERNAL MEDICINE CLINIC | Facility: CLINIC | Age: 40
End: 2020-08-29
Payer: MEDICAID

## 2020-08-29 VITALS
BODY MASS INDEX: 42.65 KG/M2 | HEART RATE: 76 BPM | SYSTOLIC BLOOD PRESSURE: 149 MMHG | HEIGHT: 66 IN | DIASTOLIC BLOOD PRESSURE: 100 MMHG | WEIGHT: 265.38 LBS

## 2020-08-29 DIAGNOSIS — R73.03 PREDIABETES: Primary | ICD-10-CM

## 2020-08-29 DIAGNOSIS — E03.8 OTHER SPECIFIED HYPOTHYROIDISM: ICD-10-CM

## 2020-08-29 PROCEDURE — 3077F SYST BP >= 140 MM HG: CPT | Performed by: INTERNAL MEDICINE

## 2020-08-29 PROCEDURE — 3080F DIAST BP >= 90 MM HG: CPT | Performed by: INTERNAL MEDICINE

## 2020-08-29 PROCEDURE — 99214 OFFICE O/P EST MOD 30 MIN: CPT | Performed by: INTERNAL MEDICINE

## 2020-08-29 PROCEDURE — 3008F BODY MASS INDEX DOCD: CPT | Performed by: INTERNAL MEDICINE

## 2020-08-29 RX ORDER — METOPROLOL SUCCINATE 50 MG/1
50 TABLET, EXTENDED RELEASE ORAL DAILY
Qty: 30 TABLET | Refills: 3 | Status: SHIPPED | OUTPATIENT
Start: 2020-08-29 | End: 2021-07-03

## 2020-08-31 NOTE — PROGRESS NOTES
HPI:    Patient ID: Deon Aiken is a 37year old female.   Presents for follow-up on hypertension  HPI  Patient reports that she has been feeling well overall, stressed out out of work on unemployment, looking for a new job, 1 interviews coming for g Glucose Blood (ONETOUCH ULTRA BLUE) In Vitro Strip Check  Blood  Sugar daily 100 strip 6   • ONETOUCH DELICA LANCETS 43H Does not apply Misc cjeck  Blood sugar daily 100 each 6   • ibuprofen (MOTRIN) 600 MG Oral Tab Take 1 tablet by mouth every 8 (eight) h of carbohydrates, regular physical activities encouraged, will monitor hemoglobin A1c every 3 to 6 months  Orders Placed This Encounter      Comp Metabolic Panel (14) [E]      TSH W Reflex To Free T4 [E]      Hemoglobin A1C [E]  Follow-up in 3 months after

## 2020-09-15 ENCOUNTER — LAB ENCOUNTER (OUTPATIENT)
Dept: LAB | Facility: HOSPITAL | Age: 40
End: 2020-09-15
Attending: OBSTETRICS & GYNECOLOGY
Payer: MEDICAID

## 2020-09-15 ENCOUNTER — OFFICE VISIT (OUTPATIENT)
Dept: OBGYN CLINIC | Facility: CLINIC | Age: 40
End: 2020-09-15
Payer: MEDICAID

## 2020-09-15 VITALS
DIASTOLIC BLOOD PRESSURE: 80 MMHG | BODY MASS INDEX: 43 KG/M2 | WEIGHT: 264 LBS | SYSTOLIC BLOOD PRESSURE: 135 MMHG | HEART RATE: 78 BPM

## 2020-09-15 DIAGNOSIS — E03.8 OTHER SPECIFIED HYPOTHYROIDISM: ICD-10-CM

## 2020-09-15 DIAGNOSIS — Z12.31 ENCOUNTER FOR SCREENING MAMMOGRAM FOR BREAST CANCER: ICD-10-CM

## 2020-09-15 DIAGNOSIS — Z12.4 SCREENING FOR MALIGNANT NEOPLASM OF CERVIX: ICD-10-CM

## 2020-09-15 DIAGNOSIS — Z01.419 ENCOUNTER FOR GYNECOLOGICAL EXAMINATION: Primary | ICD-10-CM

## 2020-09-15 DIAGNOSIS — Z11.3 SCREEN FOR STD (SEXUALLY TRANSMITTED DISEASE): ICD-10-CM

## 2020-09-15 DIAGNOSIS — R73.03 PREDIABETES: ICD-10-CM

## 2020-09-15 LAB
ALBUMIN SERPL-MCNC: 3.4 G/DL (ref 3.4–5)
ALBUMIN/GLOB SERPL: 0.8 {RATIO} (ref 1–2)
ALP LIVER SERPL-CCNC: 98 U/L (ref 37–98)
ALT SERPL-CCNC: 25 U/L (ref 13–56)
ANION GAP SERPL CALC-SCNC: 11 MMOL/L (ref 0–18)
AST SERPL-CCNC: 14 U/L (ref 15–37)
BILIRUB SERPL-MCNC: 0.8 MG/DL (ref 0.1–2)
BUN BLD-MCNC: 10 MG/DL (ref 7–18)
BUN/CREAT SERPL: 11.4 (ref 10–20)
CALCIUM BLD-MCNC: 9.3 MG/DL (ref 8.5–10.1)
CHLORIDE SERPL-SCNC: 104 MMOL/L (ref 98–112)
CO2 SERPL-SCNC: 24 MMOL/L (ref 21–32)
CREAT BLD-MCNC: 0.88 MG/DL (ref 0.55–1.02)
EST. AVERAGE GLUCOSE BLD GHB EST-MCNC: 126 MG/DL (ref 68–126)
GLOBULIN PLAS-MCNC: 4.5 G/DL (ref 2.8–4.4)
GLUCOSE BLD-MCNC: 137 MG/DL (ref 70–99)
HBA1C MFR BLD HPLC: 6 % (ref ?–5.7)
HBV SURFACE AG SER-ACNC: 0.14 [IU]/L
HBV SURFACE AG SERPL QL IA: NONREACTIVE
HCV AB SERPL QL IA: NONREACTIVE
M PROTEIN MFR SERPL ELPH: 7.9 G/DL (ref 6.4–8.2)
OSMOLALITY SERPL CALC.SUM OF ELEC: 289 MOSM/KG (ref 275–295)
PATIENT FASTING Y/N/NP: NO
POTASSIUM SERPL-SCNC: 3.4 MMOL/L (ref 3.5–5.1)
SODIUM SERPL-SCNC: 139 MMOL/L (ref 136–145)
T4 FREE SERPL-MCNC: 1 NG/DL (ref 0.8–1.7)
TSI SER-ACNC: 8.07 MIU/ML (ref 0.36–3.74)

## 2020-09-15 PROCEDURE — 87389 HIV-1 AG W/HIV-1&-2 AB AG IA: CPT

## 2020-09-15 PROCEDURE — 86780 TREPONEMA PALLIDUM: CPT

## 2020-09-15 PROCEDURE — 84439 ASSAY OF FREE THYROXINE: CPT

## 2020-09-15 PROCEDURE — 36415 COLL VENOUS BLD VENIPUNCTURE: CPT

## 2020-09-15 PROCEDURE — 3079F DIAST BP 80-89 MM HG: CPT | Performed by: OBSTETRICS & GYNECOLOGY

## 2020-09-15 PROCEDURE — 3075F SYST BP GE 130 - 139MM HG: CPT | Performed by: OBSTETRICS & GYNECOLOGY

## 2020-09-15 PROCEDURE — 84443 ASSAY THYROID STIM HORMONE: CPT

## 2020-09-15 PROCEDURE — 99386 PREV VISIT NEW AGE 40-64: CPT | Performed by: OBSTETRICS & GYNECOLOGY

## 2020-09-15 PROCEDURE — 87340 HEPATITIS B SURFACE AG IA: CPT

## 2020-09-15 PROCEDURE — 83036 HEMOGLOBIN GLYCOSYLATED A1C: CPT

## 2020-09-15 PROCEDURE — 80053 COMPREHEN METABOLIC PANEL: CPT

## 2020-09-15 PROCEDURE — 86803 HEPATITIS C AB TEST: CPT

## 2020-09-15 NOTE — PROGRESS NOTES
Pablo Alonzo is a 37year old female [id number] Patient's last menstrual period was 2020. here for annual exam.       Last seen in  for pregnancy. [place]. Had  with TL. Menses q month.   LMP  daily.  30 tablet 1   • Blood Glucose Monitoring Suppl (ONETOUCH ULTRA 2) w/Device Does not apply Kit Check  Blood sugar daily 1 kit 0   • Glucose Blood (ONETOUCH ULTRA BLUE) In Vitro Strip Check  Blood  Sugar daily 100 strip 6   • ONETOUCH DELICA LANCETS 3 normal in size, location, without lesions and prolapse  Bladder:  No fullness, masses or tenderness  Vagina:  Normal appearance without lesions, no abnormal discharge  Cervix:  Normal without tenderness on motion  Uterus: normal in size, contour, position,

## 2020-09-16 DIAGNOSIS — E03.8 OTHER SPECIFIED HYPOTHYROIDISM: Primary | ICD-10-CM

## 2020-09-16 DIAGNOSIS — R73.03 PREDIABETES: ICD-10-CM

## 2020-09-16 LAB — T PALLIDUM AB SER QL: NEGATIVE

## 2020-09-17 LAB
C TRACH DNA SPEC QL NAA+PROBE: NEGATIVE
HPV I/H RISK 1 DNA SPEC QL NAA+PROBE: NEGATIVE
N GONORRHOEA DNA SPEC QL NAA+PROBE: NEGATIVE
T VAGINALIS RRNA SPEC QL NAA+PROBE: NEGATIVE

## 2020-09-23 RX ORDER — LEVOTHYROXINE SODIUM 0.05 MG/1
50 TABLET ORAL
Qty: 90 TABLET | Refills: 0 | Status: SHIPPED | OUTPATIENT
Start: 2020-09-23 | End: 2021-01-12

## 2020-10-07 RX ORDER — ALPRAZOLAM 0.5 MG/1
0.5 TABLET ORAL NIGHTLY PRN
Qty: 30 TABLET | Refills: 1 | Status: SHIPPED | OUTPATIENT
Start: 2020-10-07 | End: 2020-12-11

## 2020-12-11 RX ORDER — ALPRAZOLAM 0.5 MG/1
0.5 TABLET ORAL NIGHTLY PRN
Qty: 30 TABLET | Refills: 0 | Status: SHIPPED | OUTPATIENT
Start: 2020-12-11 | End: 2021-01-12

## 2021-01-12 RX ORDER — LEVOTHYROXINE SODIUM 0.05 MG/1
TABLET ORAL
Qty: 90 TABLET | Refills: 0 | Status: SHIPPED | OUTPATIENT
Start: 2021-01-12 | End: 2021-05-19

## 2021-01-12 RX ORDER — ALPRAZOLAM 0.5 MG/1
0.5 TABLET ORAL NIGHTLY PRN
Qty: 30 TABLET | Refills: 0 | Status: SHIPPED
Start: 2021-01-12 | End: 2021-02-16

## 2021-01-13 ENCOUNTER — TELEPHONE (OUTPATIENT)
Dept: INTERNAL MEDICINE CLINIC | Facility: CLINIC | Age: 41
End: 2021-01-13

## 2021-01-13 NOTE — TELEPHONE ENCOUNTER
Pt states she was not able to read [employer] message. Informed pt message was sent to inform her prescriptions have been refilled. Pt then stated [facility] pharmacy did not receive the prescriptions, noted message \"transmission failed\" in EMR.  Advised pt prescr

## 2021-02-16 RX ORDER — ALPRAZOLAM 0.5 MG/1
TABLET ORAL
Qty: 30 TABLET | Refills: 0 | Status: SHIPPED | OUTPATIENT
Start: 2021-02-16 | End: 2021-09-08

## 2021-05-19 RX ORDER — LEVOTHYROXINE SODIUM 0.05 MG/1
TABLET ORAL
Qty: 90 TABLET | Refills: 0 | Status: SHIPPED | OUTPATIENT
Start: 2021-05-19 | End: 2021-08-30

## 2021-05-19 RX ORDER — LISINOPRIL 5 MG/1
TABLET ORAL
Qty: 90 TABLET | Refills: 0 | Status: SHIPPED | OUTPATIENT
Start: 2021-05-19 | End: 2021-08-30

## 2021-05-26 VITALS
WEIGHT: 273.6 LBS | BODY MASS INDEX: 45.58 KG/M2 | OXYGEN SATURATION: 99 % | TEMPERATURE: 98.9 F | DIASTOLIC BLOOD PRESSURE: 80 MMHG | SYSTOLIC BLOOD PRESSURE: 126 MMHG | HEART RATE: 98 BPM | RESPIRATION RATE: 16 BRPM | HEIGHT: 65 IN

## 2021-05-31 ENCOUNTER — LAB ENCOUNTER (OUTPATIENT)
Dept: LAB | Facility: HOSPITAL | Age: 41
End: 2021-05-31
Attending: INTERNAL MEDICINE
Payer: MEDICAID

## 2021-05-31 DIAGNOSIS — E03.8 OTHER SPECIFIED HYPOTHYROIDISM: ICD-10-CM

## 2021-05-31 DIAGNOSIS — R73.03 PREDIABETES: ICD-10-CM

## 2021-05-31 PROCEDURE — 80053 COMPREHEN METABOLIC PANEL: CPT

## 2021-05-31 PROCEDURE — 36415 COLL VENOUS BLD VENIPUNCTURE: CPT

## 2021-05-31 PROCEDURE — 84443 ASSAY THYROID STIM HORMONE: CPT

## 2021-05-31 PROCEDURE — 83036 HEMOGLOBIN GLYCOSYLATED A1C: CPT

## 2021-06-02 ENCOUNTER — TELEPHONE (OUTPATIENT)
Dept: INTERNAL MEDICINE CLINIC | Facility: CLINIC | Age: 41
End: 2021-06-02

## 2021-06-08 NOTE — PROGRESS NOTES
HPI/Subjective:   Patient ID: Gianni Perez is a 41year old female presents follow-up on anxiety, hypertension, hypothyroidism    HPI  Patient feels that she feels panicky every single day multiple times a day her heart start pounding chest and squee Tab TAKE ONE TABLET BY MOUTH IN THE EVENING AS NEEDED 30 tablet 0   • Metoprolol Succinate ER 50 MG Oral Tablet 24 Hr Take 1 tablet (50 mg total) by mouth daily.  30 tablet 3   • Blood Glucose Monitoring Suppl (ONETOUCH ULTRA 2) w/Device Does not apply Kit psychiatric help with history of bipolar disorder antidepressants should be given carefully she understands, follow-up in 1 month, use alprazolam as needed  Hypothyroidism acquired controlled on current dose of medication continue levothyroxine 50 mg daily

## 2021-07-03 RX ORDER — METOPROLOL SUCCINATE 50 MG/1
50 TABLET, EXTENDED RELEASE ORAL DAILY
Qty: 30 TABLET | Refills: 0 | Status: SHIPPED | OUTPATIENT
Start: 2021-07-03 | End: 2021-08-25

## 2021-07-14 RX ORDER — ALPRAZOLAM 0.5 MG/1
TABLET ORAL
Qty: 30 TABLET | Refills: 0 | Status: SHIPPED | OUTPATIENT
Start: 2021-07-14 | End: 2021-09-08

## 2021-07-19 ENCOUNTER — HOSPITAL ENCOUNTER (OUTPATIENT)
Age: 41
Discharge: HOME | End: 2021-07-19
Payer: MEDICAID

## 2021-07-19 VITALS
OXYGEN SATURATION: 96 % | RESPIRATION RATE: 18 BRPM | DIASTOLIC BLOOD PRESSURE: 73 MMHG | SYSTOLIC BLOOD PRESSURE: 116 MMHG | TEMPERATURE: 97 F | HEART RATE: 74 BPM

## 2021-07-19 DIAGNOSIS — H92.01 EAR PAIN, RIGHT: Primary | ICD-10-CM

## 2021-07-19 PROCEDURE — 69209 REMOVE IMPACTED EAR WAX UNI: CPT

## 2021-07-19 PROCEDURE — 99212 OFFICE O/P EST SF 10 MIN: CPT

## 2021-07-19 NOTE — ED PROVIDER NOTES
Patient Seen in: [facility] [place]      History   Patient presents with:  Ear Pain    Stated Complaint: ear problem    HPI/Subjective:   HPI    40 yo female here for evaluation of b/l ear pain.   Pt states she has had muffled hearing and pain for the ear normal.      Nose: Nose normal.      Mouth/Throat:      Mouth: Mucous membranes are moist.   Eyes:      Extraocular Movements: Extraocular movements intact. Pupils: Pupils are equal, round, and reactive to light.    Cardiovascular:      Rate and Rh

## 2021-08-18 ENCOUNTER — TELEPHONE (OUTPATIENT)
Dept: INTERNAL MEDICINE CLINIC | Facility: CLINIC | Age: 41
End: 2021-08-18

## 2021-08-18 NOTE — TELEPHONE ENCOUNTER
Leticia from [place] wants to inform you that patient was admitted to [facility] from 8/10-8/13. Thank you.

## 2021-08-25 RX ORDER — METOPROLOL SUCCINATE 50 MG/1
50 TABLET, EXTENDED RELEASE ORAL DAILY
Qty: 90 TABLET | Refills: 1 | Status: SHIPPED | OUTPATIENT
Start: 2021-08-25

## 2021-08-30 RX ORDER — LISINOPRIL 5 MG/1
5 TABLET ORAL DAILY
Qty: 90 TABLET | Refills: 1 | Status: SHIPPED | OUTPATIENT
Start: 2021-08-30 | End: 2021-12-01

## 2021-08-30 RX ORDER — LEVOTHYROXINE SODIUM 0.05 MG/1
TABLET ORAL
Qty: 90 TABLET | Refills: 0 | Status: SHIPPED | OUTPATIENT
Start: 2021-08-30 | End: 2021-12-01

## 2021-08-30 NOTE — TELEPHONE ENCOUNTER
Refill passed per [place] [facility] protocol.     Requested Prescriptions   Pending Prescriptions Disp Refills    LISINOPRIL 5 MG Oral Tab [Pharmacy Med Name: Lisinopril 5 Mg Tab Solc] 90 tablet 0     Sig: TAKE ONE TABLET BY MOUTH ONE TIME DAILY        Hypertensive Medications Protocol Passed - 2021  7:09 AM        Passed - CMP or BMP in past 12 months        Passed - Appointment in past 6 or next 3 months        Passed - GFR Non- > 50     Lab Results   Component Value Date    GFRNAA 109 2021                  LEVOTHYROXINE 50 MCG Oral Tab [Pharmacy Med Name: Levothyroxine Sodium 50 Mcg Tab Lupi] 90 tablet 0     Sig: TAKE ONE TABLET BY MOUTH ONE TIME DAILY        Thyroid Medication Protocol Passed - 2021  7:09 AM        Passed - TSH in past 12 months        Passed - Last TSH value is normal     Lab Results   Component Value Date    TSH 1.100 2021    THYROIDFUNC 7.74 (H) 2015                 Passed - Appointment in past 12 or next 3 months                  Recent Outpatient Visits              [facility]    Office Visit    11 months ago Encounter for gynecological examination    [facility] for Ofelia Robbins MD    Office Visit    1 year ago Jeff Gallegos [place], MD    Office Visit    1 year ago Other specified hypothyroidism    Jeff Corona [place], [place]    Office Visit    1 year ago Other specified hypothyroidism    Ashwini Corona MD    Office Visit

## 2021-09-08 ENCOUNTER — NURSE TRIAGE (OUTPATIENT)
Dept: INTERNAL MEDICINE CLINIC | Facility: CLINIC | Age: 41
End: 2021-09-08

## 2021-09-08 NOTE — TELEPHONE ENCOUNTER
PT  SHOULD BE  EVALUATED  by any available provider  at the  [facility] or [facility] , she should  bring  all meds she takes for appt

## 2021-09-08 NOTE — TELEPHONE ENCOUNTER
Patient is returning call, she is on lexapro 10 mg daily, lorazepam 1 mg [prescribed by [facility] provider (to replace the Alprazolam 0.5 mg) has trazadone but isn't taking it per pt, takes b/p meds and thyroid med prescribed by pcp.

## 2021-09-08 NOTE — TELEPHONE ENCOUNTER
Action Requested: Summary for Provider     []  Critical Lab, Recommendations Needed  [] Need Additional Advice  []   FYI    []   Need Orders  [x] Need Medications Sent to Pharmacy  []  Other     SUMMARY: patient calling to ask for a muscle relaxer to be

## 2021-09-08 NOTE — TELEPHONE ENCOUNTER
Left message for the patient that I would like to know what medication she is taking currently, recently she was treated at another hospital for mental health issue, I need to make sure that medication I will prescribe he has no interaction.

## 2021-09-10 ENCOUNTER — OFFICE VISIT (OUTPATIENT)
Dept: INTERNAL MEDICINE CLINIC | Facility: CLINIC | Age: 41
End: 2021-09-10
Payer: MEDICAID

## 2021-09-10 VITALS
HEART RATE: 73 BPM | SYSTOLIC BLOOD PRESSURE: 134 MMHG | WEIGHT: 268.81 LBS | HEIGHT: 66 IN | BODY MASS INDEX: 43.2 KG/M2 | DIASTOLIC BLOOD PRESSURE: 86 MMHG

## 2021-09-10 DIAGNOSIS — S39.012D STRAIN OF LUMBAR REGION, SUBSEQUENT ENCOUNTER: ICD-10-CM

## 2021-09-10 DIAGNOSIS — E03.8 OTHER SPECIFIED HYPOTHYROIDISM: ICD-10-CM

## 2021-09-10 DIAGNOSIS — R73.03 PREDIABETES: Primary | ICD-10-CM

## 2021-09-10 PROCEDURE — 99214 OFFICE O/P EST MOD 30 MIN: CPT | Performed by: INTERNAL MEDICINE

## 2021-09-10 PROCEDURE — 3079F DIAST BP 80-89 MM HG: CPT | Performed by: INTERNAL MEDICINE

## 2021-09-10 PROCEDURE — 3075F SYST BP GE 130 - 139MM HG: CPT | Performed by: INTERNAL MEDICINE

## 2021-09-10 PROCEDURE — 3008F BODY MASS INDEX DOCD: CPT | Performed by: INTERNAL MEDICINE

## 2021-09-10 RX ORDER — LORAZEPAM 1 MG/1
TABLET ORAL
COMMUNITY
Start: 2021-08-13

## 2021-09-10 RX ORDER — DICLOFENAC POTASSIUM 50 MG/1
50 TABLET, FILM COATED ORAL 2 TIMES DAILY
Qty: 60 TABLET | Refills: 0 | Status: SHIPPED | OUTPATIENT
Start: 2021-09-10

## 2021-09-11 NOTE — PROGRESS NOTES
HPI/Subjective:   Patient ID: Dar Villagran is a 41year old female.   Presents for evaluation of the back pain     HPI  Patient reports that 3 days ago she developed lumbar pain, seems for no reason, bent over and when she straightened up helped l Pressure Monitoring (BLOOD PRESSURE CUFF) Does not apply Misc USE TO MONITOR BLOOD PRESSURES PER DOCTORS INSTRUCTIONS. 1 each 0   • traZODone HCl 50 MG Oral Tab Take  1-2 tab po at bedtime (Patient not taking: Reported on 9/10/2021 ) 180 tablet 0     Aller

## 2021-11-02 ENCOUNTER — TELEPHONE (OUTPATIENT)
Dept: INTERNAL MEDICINE CLINIC | Facility: CLINIC | Age: 41
End: 2021-11-02

## 2021-11-02 NOTE — TELEPHONE ENCOUNTER
Patient just left [address] [employer]. Feels very overwhelmed and was crying with her counselor. Patient has an appointment with you 11/17/2021. Patient feels she may need her medications adjusted or a mood stabilizer added.     Currently on Pro

## 2021-11-09 ENCOUNTER — LAB ENCOUNTER (OUTPATIENT)
Dept: LAB | Age: 41
End: 2021-11-09
Attending: INTERNAL MEDICINE
Payer: MEDICAID

## 2021-11-09 ENCOUNTER — TELEPHONE (OUTPATIENT)
Dept: INTERNAL MEDICINE CLINIC | Facility: CLINIC | Age: 41
End: 2021-11-09

## 2021-11-09 DIAGNOSIS — R73.03 PREDIABETES: ICD-10-CM

## 2021-11-09 DIAGNOSIS — E03.8 OTHER SPECIFIED HYPOTHYROIDISM: ICD-10-CM

## 2021-11-09 PROCEDURE — 85025 COMPLETE CBC W/AUTO DIFF WBC: CPT

## 2021-11-09 PROCEDURE — 80053 COMPREHEN METABOLIC PANEL: CPT

## 2021-11-09 PROCEDURE — 80061 LIPID PANEL: CPT

## 2021-11-09 PROCEDURE — 83036 HEMOGLOBIN GLYCOSYLATED A1C: CPT

## 2021-11-09 PROCEDURE — 36415 COLL VENOUS BLD VENIPUNCTURE: CPT

## 2021-11-09 PROCEDURE — 84443 ASSAY THYROID STIM HORMONE: CPT

## 2021-11-09 NOTE — TELEPHONE ENCOUNTER
Hamzah/ Clinical Technician of "[employer] is calling to follow up on status of prior auth for patient's medication, amphetamine-dextroamphetamine (ADDERALL) 10 MG Oral Tab. Kezia Daley states there was no diagnosis listed for medication.

## 2021-11-09 NOTE — TELEPHONE ENCOUNTER
Patient was seen 11/06 and prescribed this medication. Note incomplete. Dr. Eleanor Watkins please advise what diagnosis you are using for this medication. May call back to [phone].

## 2021-11-10 ENCOUNTER — TELEPHONE (OUTPATIENT)
Dept: INTERNAL MEDICINE CLINIC | Facility: CLINIC | Age: 41
End: 2021-11-10

## 2021-11-10 DIAGNOSIS — F41.9 ANXIETY: ICD-10-CM

## 2021-11-10 DIAGNOSIS — J40 BRONCHITIS: ICD-10-CM

## 2021-11-10 RX ORDER — BLOOD-GLUCOSE METER
EACH MISCELLANEOUS
Qty: 1 KIT | Refills: 0 | Status: CANCELLED | OUTPATIENT
Start: 2021-11-10

## 2021-11-10 NOTE — TELEPHONE ENCOUNTER
Patient is requesting blood glucose machine and testing supplies to be sent to [facility]    Please advise

## 2021-11-11 RX ORDER — LANCETS 30 GAUGE
1 EACH MISCELLANEOUS 2 TIMES DAILY
Qty: 200 EACH | Refills: 3 | Status: SHIPPED | OUTPATIENT
Start: 2021-11-11

## 2021-11-11 RX ORDER — BLOOD SUGAR DIAGNOSTIC
STRIP MISCELLANEOUS
Qty: 200 EACH | Refills: 3 | Status: SHIPPED | OUTPATIENT
Start: 2021-11-11

## 2021-11-11 RX ORDER — BLOOD-GLUCOSE METER
EACH MISCELLANEOUS
Qty: 1 KIT | Refills: 0 | Status: SHIPPED | OUTPATIENT
Start: 2021-11-11

## 2021-11-11 RX ORDER — DEXTROAMPHETAMINE SACCHARATE, AMPHETAMINE ASPARTATE, DEXTROAMPHETAMINE SULFATE AND AMPHETAMINE SULFATE 2.5; 2.5; 2.5; 2.5 MG/1; MG/1; MG/1; MG/1
TABLET ORAL
Qty: 60 TABLET | Refills: 0 | Status: CANCELLED | OUTPATIENT
Start: 2021-11-11

## 2021-11-11 NOTE — TELEPHONE ENCOUNTER
Patient indicated that currently at lunch till 4:30pm and working till [employer] [facility]. Indicated that Dr Nora Russell called and left a message to see how patient is doing on the adderall.  Patient indicated that started the adderall on Sunday 11/7/2021 and not havi

## 2021-11-11 NOTE — TELEPHONE ENCOUNTER
Patient calling, identified name and ,   Asking about status of BG supplies     Also mentioned she has been taking Adderall  was only given on week worth  of medication  Asking if she can one month worth of medication  ( until she  can see psych )

## 2021-11-12 NOTE — PROGRESS NOTES
Subjective:   Patient ID: Dar Villagran is a 35year old female.  Presents for follow-up on anxiety    HPI  Patient reports that she has been seeing a counselor at health department, and he has appointment scheduled with psychiatrist. She continues ONETOUCH DELICA LANCETS 25X Does not apply Misc cjeck  Blood sugar daily (Patient not taking: Reported on 11/6/2021) 100 each 6   • Blood Pressure Monitoring (BLOOD PRESSURE CUFF) Does not apply Misc USE TO MONITOR BLOOD PRESSURES PER DOCTORS INSTRUCTIONS.

## 2021-11-15 RX ORDER — DEXTROAMPHETAMINE SACCHARATE, AMPHETAMINE ASPARTATE, DEXTROAMPHETAMINE SULFATE AND AMPHETAMINE SULFATE 2.5; 2.5; 2.5; 2.5 MG/1; MG/1; MG/1; MG/1
TABLET ORAL
Qty: 35 TABLET | Refills: 0 | Status: CANCELLED | OUTPATIENT
Start: 2021-11-15

## 2021-11-15 RX ORDER — DEXTROAMPHETAMINE SACCHARATE, AMPHETAMINE ASPARTATE, DEXTROAMPHETAMINE SULFATE AND AMPHETAMINE SULFATE 2.5; 2.5; 2.5; 2.5 MG/1; MG/1; MG/1; MG/1
TABLET ORAL
Qty: 60 TABLET | Refills: 0 | Status: SHIPPED | OUTPATIENT
Start: 2021-11-15 | End: 2021-11-15

## 2021-11-15 NOTE — TELEPHONE ENCOUNTER
Please resend.   pended    E-Prescribing Status: Transmission to pharmacy failed (11/15/2021  3:22 PM CST)

## 2021-11-15 NOTE — TELEPHONE ENCOUNTER
Patient calling for refill on her adderall which is a newer medication for her. So far, she's doing well on it.  Per patient she had an appt scheduled with [facility] but they canceled due to the provider being sick so she's rescheduled for an appt i

## 2021-11-16 RX ORDER — DEXTROAMPHETAMINE SACCHARATE, AMPHETAMINE ASPARTATE, DEXTROAMPHETAMINE SULFATE AND AMPHETAMINE SULFATE 2.5; 2.5; 2.5; 2.5 MG/1; MG/1; MG/1; MG/1
TABLET ORAL
Qty: 60 TABLET | Refills: 0 | Status: SHIPPED | OUTPATIENT
Start: 2021-11-16 | End: 2021-12-16 | Stop reason: ALTCHOICE

## 2021-11-16 NOTE — TELEPHONE ENCOUNTER
Patient calling to check status of medication. Informed  re-sent Adderall today. No further questions.

## 2021-11-18 ENCOUNTER — TELEPHONE (OUTPATIENT)
Dept: INTERNAL MEDICINE CLINIC | Facility: CLINIC | Age: 41
End: 2021-11-18

## 2021-11-18 NOTE — TELEPHONE ENCOUNTER
Please let patient know I placed referral for her to see Dr. Jomar Mullen and endocrinologist, in the usual recommend patients to buy BP machineOmron

## 2021-11-18 NOTE — TELEPHONE ENCOUNTER
Informed patient of advice per Dr. Riky Cerrato. Patient states she uses her father's bp cuff which is a good brand. States her current bp is 154/84, but took an Adderall about an hour prior.   Informed patient to keep a diary of her pressures and to call back

## 2021-11-18 NOTE — TELEPHONE ENCOUNTER
The patient has 2 issues: The patient stated she made an appointment to see an endocrinologist  doctor for she stated has thyroid issues, is over weight, and pre diabetic.    She stated if ok with Dr. Garret Yost will need a Referral.    Per the patient is to

## 2021-11-18 NOTE — TELEPHONE ENCOUNTER
Spoke to patient, advised her not to take Adderall, monitor blood pressure pulse and report in 3 to 4 days so we can determine if it is safe for her to take her Adderall

## 2021-11-30 ENCOUNTER — LAB ENCOUNTER (OUTPATIENT)
Dept: LAB | Age: 41
End: 2021-11-30
Attending: NURSE PRACTITIONER
Payer: MEDICAID

## 2021-11-30 ENCOUNTER — NURSE TRIAGE (OUTPATIENT)
Dept: INTERNAL MEDICINE CLINIC | Facility: CLINIC | Age: 41
End: 2021-11-30

## 2021-11-30 ENCOUNTER — TELEMEDICINE (OUTPATIENT)
Dept: INTERNAL MEDICINE CLINIC | Facility: CLINIC | Age: 41
End: 2021-11-30
Payer: MEDICAID

## 2021-11-30 ENCOUNTER — TELEPHONE (OUTPATIENT)
Dept: INTERNAL MEDICINE CLINIC | Facility: CLINIC | Age: 41
End: 2021-11-30

## 2021-11-30 DIAGNOSIS — R53.83 OTHER FATIGUE: Primary | ICD-10-CM

## 2021-11-30 DIAGNOSIS — I10 ESSENTIAL HYPERTENSION: ICD-10-CM

## 2021-11-30 DIAGNOSIS — R42 DIZZINESS: ICD-10-CM

## 2021-11-30 DIAGNOSIS — F41.9 ANXIETY: ICD-10-CM

## 2021-11-30 DIAGNOSIS — F90.0 ATTENTION DEFICIT HYPERACTIVITY DISORDER (ADHD), PREDOMINANTLY INATTENTIVE TYPE: ICD-10-CM

## 2021-11-30 DIAGNOSIS — R53.83 OTHER FATIGUE: ICD-10-CM

## 2021-11-30 PROCEDURE — 99214 OFFICE O/P EST MOD 30 MIN: CPT | Performed by: NURSE PRACTITIONER

## 2021-11-30 PROCEDURE — 85025 COMPLETE CBC W/AUTO DIFF WBC: CPT | Performed by: NURSE PRACTITIONER

## 2021-11-30 PROCEDURE — 36415 COLL VENOUS BLD VENIPUNCTURE: CPT | Performed by: NURSE PRACTITIONER

## 2021-11-30 PROCEDURE — 80048 BASIC METABOLIC PNL TOTAL CA: CPT | Performed by: NURSE PRACTITIONER

## 2021-11-30 NOTE — TELEPHONE ENCOUNTER
Mom calling with patient on the line. Per mom patient has Video appt for today but can't get into the visit.  Advised a my chart message should show a linc, once the linc is visible she should click on the linc and she will be in a virtual waiting room and

## 2021-11-30 NOTE — PROGRESS NOTES
Video Check-In    Antionette Truong verbally consents to a Video Check-In visit on 11/30/21. Patient understands and accepts financial responsibility for any deductible, co-insurance and/or co-pays associated with this service.     Tessa Khoury is TABLET BY MOUTH ONE TIME DAILY , Disp: 90 tablet, Rfl: 0  •  metoprolol succinate 50 MG Oral Tablet 24 Hr, Take 1 tablet (50 mg total) by mouth daily. , Disp: 90 tablet, Rfl: 1  •  Blood Glucose Monitoring Suppl (ONETOUCH ULTRA 2) w/Device Does not apply Ki known cause. Pt did not take adderall today. (per pt okay to discuss w/ pt mom)    Dizziness  The current episode started yesterday. The problem occurs intermittently. The problem has been gradually worsening.  Associated symptoms include fatigue and a visu sweats, weight loss, loss of appetite  Neurological: Denies frequent headaches  Cardiovascular: Denies leg swelling, chest pain or pressure after eating or when upset, irregular heart rate/palpitations, dizziness, N,V, exertional arm pain nor neck pain  Pa DIFFERENTIAL WITH PLATELET  -     BASIC METABOLIC PANEL (8)    Essential hypertension  Careful with diet and excercise at least 30 minutes 3-4 times a week. Check blood pressures at different times on different days.   Bake foods more and grill occasionally

## 2021-11-30 NOTE — TELEPHONE ENCOUNTER
Action Requested: Summary for Provider     []  Critical Lab, Recommendations Needed  [] Need Additional Advice  []   FYI    []   Need Orders  [] Need Medications Sent to Pharmacy  []  Other     SUMMARY: Per protocol: OV today.  Video visit made per request.

## 2021-12-01 ENCOUNTER — TELEPHONE (OUTPATIENT)
Dept: INTERNAL MEDICINE CLINIC | Facility: CLINIC | Age: 41
End: 2021-12-01

## 2021-12-01 RX ORDER — LISINOPRIL 5 MG/1
5 TABLET ORAL DAILY
Qty: 90 TABLET | Refills: 1 | Status: SHIPPED | OUTPATIENT
Start: 2021-12-01 | End: 2022-10-27

## 2021-12-01 RX ORDER — LEVOTHYROXINE SODIUM 0.05 MG/1
50 TABLET ORAL DAILY
Qty: 90 TABLET | Refills: 1 | Status: SHIPPED | OUTPATIENT
Start: 2021-12-01 | End: 2022-10-27

## 2021-12-01 NOTE — TELEPHONE ENCOUNTER
Sandoval Butler, LPC  Airam Armijo, APRN  Hello [place],     I spoke with the patient and at this time she has declined additional behavioral health resources as she reports that she has a therapist through [facility] and is not inte

## 2021-12-01 NOTE — TELEPHONE ENCOUNTER
Verified name and . Patient calling to follow up after telemedicine appointment. She states she cannot find letter that was sent by Ernestina Rhodes on her MyChart. This RN assisted her with navigating MyChart and locating letter.     She had no further qu

## 2021-12-01 NOTE — TELEPHONE ENCOUNTER
Refill passed per [employer] protocol.       Requested Prescriptions   Pending Prescriptions Disp Refills    LISINOPRIL 5 MG Oral Tab [Pharmacy Med Name: Lisinopril 5 Mg Tab Solc] 90 tablet 0     Sig: TAKE ONE TABLET BY MOUTH ONE TIME DAILY        Hypertensive Medications Protocol Passed - 12/1/2021  8:45 AM        Passed - CMP or BMP in past 12 months        Passed - Appointment in past 6 or next 3 months        Passed - GFR Non- > 50     Lab Results   Component Value Date    GFRNAA 95 11/30/2021                    LEVOTHYROXINE 50 MCG Oral Tab [Pharmacy Med Name: Levothyroxine Sodium 50 Mcg Tab Lupi] 90 tablet 0     Sig: TAKE ONE TABLET BY MOUTH ONE TIME DAILY        Thyroid Medication Protocol Passed - 12/1/2021  8:45 AM        Passed - TSH in past 12 months        Passed - Last TSH value is normal     Lab Results   Component Value Date    TSH 2.400 11/09/2021    THYROIDFUNC 7.74 (H) 12/22/2015                 Passed - Appointment in past 12 or next 3 months               Future Appointments         Provider Department Appt Notes    In 1 month Tuan Thomas MD [facility] End[facility]ology Thyroid issue, policy informed pt will send referral            Recent Outpatient Visits              Yesterday Other fatigue    [address] APRN    Telemedicine    3 weeks ago [facility], [address], [place] Blondie Childes, MD    Office Visit    2 months ago [facility], [place] Blondie Childes, MD    Office Visit    5 months ago [facility], [address], [place] Blondie Childes, MD    Office Visit    1 year ago Encounter for gy[facility]ological examination    [facility] Davida Gatica MD    Office Visit

## 2021-12-06 ENCOUNTER — APPOINTMENT (OUTPATIENT)
Dept: CT IMAGING | Facility: HOSPITAL | Age: 41
End: 2021-12-06
Attending: EMERGENCY MEDICINE
Payer: MEDICAID

## 2021-12-06 ENCOUNTER — TELEPHONE (OUTPATIENT)
Dept: INTERNAL MEDICINE CLINIC | Facility: CLINIC | Age: 41
End: 2021-12-06

## 2021-12-06 PROCEDURE — 70450 CT HEAD/BRAIN W/O DYE: CPT | Performed by: EMERGENCY MEDICINE

## 2021-12-06 NOTE — ED PROVIDER NOTES
Patient Seen in: [facility] Emergency Department    History   Patient presents with:  Eval-P    Stated Complaint: confused    HPI    Patient presents to the emergency department with her mother.   Her mother feels that she is been declining in her Monitoring Suppl (ONETOUCH ULTRA 2) w/Device Does not apply Kit,  Test blood sugar 2 times daily   Glucose Blood (ONETOUCH ULTRA) In Vitro Strip,  Test blood sugar 2 times daily   OneTouch Delica Lancets 58L Does not apply Misc,  1 lancet by Does not apply Alert, well nourished adult lying in bed in no distress. Vital signs noted. Eye:  No scleral icterus. Eyelids appear normal, no lesions. Cardiovascular:  Normal S1 and S2, no murmur, regular, with good peripheral perfusion.   Respiratory:  Lungs clear t BUN/CREA Ratio 8.2 (*)     All other components within normal limits   URINALYSIS WITH CULTURE REFLEX - Abnormal; Notable for the following components:    Clarity Urine Hazy (*)     Ketones Urine Trace (*)     Blood Urine Small (*)     Nitrite Urine Pos

## 2021-12-06 NOTE — ED NOTES
[facility] - no beds, can check back after 1900  [facility] - accepting packet  [facility] - no beds

## 2021-12-06 NOTE — ED QUICK NOTES
Mother at the bedside. Patient aware of pending blood draw and CT brain/head that was ordered. No questions at this time.

## 2021-12-06 NOTE — ED QUICK NOTES
Pt jumped from bed and started to bang on glass doors and screaming, she wants \"everyone to pray\" pt hyperventilating, able to calm down, Dr. Richardson Alert ordered xyprexa IM

## 2021-12-06 NOTE — BH LEVEL OF CARE ASSESSMENT
Crisis Evaluation Assessment    Solange Rivers YOB: 1980   Age 35year old MRN [id number]   Location [address] Attending Jac Jiménez MD      Patient's legal sex: female  Patient identifies as: f indicates thoughts of suicide with no plan or intent. Patient is unable to care for herself currently due to level of confusion, delusions, and generally inability to process current information.   When asked if patient was caring for her ADLs such as brus Means  Has access to means to attempt suicide or harm others or property: No  Discussion of Removal of Access to Means: Patient denies having plan for suicide, unable to answer further due to mental status  Access to Firearm/Weapon: No  Discussion of Remov (Calculated): 42.7  IBW LBS [place]: 130 LBS  IBW %: 203.5 %  IBW + 10%: 143 LBS  IBW - 10%: 117 LBS                                                                         Abuse Assessment:  Abuse Assessment  Physical Abuse: Unable to assess (Unable to answ state  Judgment: Poor  Fair/poor judgment as evidenced by: Patient has been refusing services for at least 1 week  Thought Patterns  Clarity/Relevance: Incoherent; Illogical;Irrelevant to topic  Flow: Disorganized;Guarded  Content: Delusions  Level of Consc attempt is made to correct errors during dictation, discrepancies may still exist.

## 2021-12-06 NOTE — TELEPHONE ENCOUNTER
Spoke to patient's mom (verified full name and ). She is not on DAGMAR so could not give information. Patient is in ER right now. Mom is not allowed in.  Mom said that patient has been acting really weird for a few months and is having personality boyce

## 2021-12-06 NOTE — ED INITIAL ASSESSMENT (HPI)
\"I am confused about what is reality and what isn't. \" states she has felt like this since February. Has not been seen for this prior to today. Flight of ideas in triage. Incoherent.

## 2021-12-07 NOTE — ED QUICK NOTES
Ladonna Roman at [facility] called for report, accepting MD Felicity Dandy, send pt after 2000

## 2021-12-07 NOTE — ED PROVIDER NOTES
Received in signout from Dr. Paddy Gonzalez, patient awaiting transfer for psychiatric evaluation and management for psychosis. Remains calm and cooperative throughout my shift.

## 2021-12-07 NOTE — ED QUICK NOTES
Superior at bedside, report given. All paperwork sent with pt. Belongings returned. Pt left in cooperative and in stable condition.

## 2021-12-09 NOTE — CM/SW NOTE
Received call from patient's mother Norm Cabello, inquiring about her dtr at [address]. She wanted to know what physician sent her there? How long she was going to be there?   SEANM explained that once she is there it will be decided by the physicians at [facility]

## 2021-12-15 ENCOUNTER — HOSPITAL ENCOUNTER (OUTPATIENT)
Age: 41
Discharge: HOME | End: 2021-12-15
Attending: EMERGENCY MEDICINE
Payer: MEDICAID

## 2021-12-15 VITALS
HEIGHT: 66 IN | SYSTOLIC BLOOD PRESSURE: 131 MMHG | BODY MASS INDEX: 35.36 KG/M2 | RESPIRATION RATE: 18 BRPM | OXYGEN SATURATION: 98 % | TEMPERATURE: 97 F | DIASTOLIC BLOOD PRESSURE: 79 MMHG | WEIGHT: 220 LBS | HEART RATE: 86 BPM

## 2021-12-15 DIAGNOSIS — U07.1 ACUTE COVID-19: Primary | ICD-10-CM

## 2021-12-15 PROCEDURE — 99214 OFFICE O/P EST MOD 30 MIN: CPT

## 2021-12-15 RX ORDER — ARIPIPRAZOLE 10 MG/1
TABLET ORAL
COMMUNITY
Start: 2021-12-14

## 2021-12-15 NOTE — ED PROVIDER NOTES
Patient Seen in: [facility]      History   Patient presents with:  Cough/URI    Stated Complaint: COUGH/COVID TEST    Subjective:   HPI    20-year-old female presents for evaluation for cough and sneezing for the past 2 days.   Patient has bee not in acute distress. Appearance: Normal appearance. HENT:      Head: Normocephalic and atraumatic. Jaw: No trismus. Right Ear: Tympanic membrane normal. No mastoid tenderness.       Left Ear: Tympanic membrane normal. No mastoid tenderness etesevimab. Oxygen saturations are normal.  She was discharged after the infusion and was monitored for the requisite period of time without any reaction. Imaging:   No results found. I personally reviewed all radiology images.     Medical Record Rev visit.  There is no disposition time on file for this visit.     Follow-up:  Celena Schwartz MD  [address] [place] [address]  [phone]    Schedule an appointment as soon as possible for a visit   As needed          Medications Prescribed:  Estephania Chow

## 2021-12-16 ENCOUNTER — TELEMEDICINE (OUTPATIENT)
Dept: INTERNAL MEDICINE CLINIC | Facility: CLINIC | Age: 41
End: 2021-12-16
Payer: MEDICAID

## 2021-12-16 DIAGNOSIS — U07.1 COVID-19: Primary | ICD-10-CM

## 2021-12-16 PROCEDURE — 99213 OFFICE O/P EST LOW 20 MIN: CPT | Performed by: NURSE PRACTITIONER

## 2021-12-16 NOTE — PROGRESS NOTES
Virtual Visit Check-In    Michela Evans or legal guardian   verbally consents to a Virtual Visit Check-In service on 12/16/2021    Patient understands and accepts financial responsibility for any deductible, co-insurance and/or co-pays associated with 200 each 3   • LORazepam 1 MG Oral Tab Take 0.5-1 tablet by mouth as needed     • diclofenac 50 MG Oral Tab Take 1 tablet (50 mg total) by mouth 2 (two) times daily.  60 tablet 0   • metoprolol succinate 50 MG Oral Tablet 24 Hr Take 1 tablet (50 mg total) b breath.     ASSESSMENT/PLAN:   Covid-19  (primary encounter diagnosis)    1. COVID-19  dw patient to quarantine for 10 days from when she tested positive   dw patient to receive booster shot after she has recovered   dw patient to monitor her symptoms if th

## 2021-12-20 ENCOUNTER — TELEPHONE (OUTPATIENT)
Dept: INTERNAL MEDICINE CLINIC | Facility: CLINIC | Age: 41
End: 2021-12-20

## 2021-12-20 NOTE — TELEPHONE ENCOUNTER
Pt received PAB infusion at  on 12/15 for COVID-19. Please follow-up with pt for post-infusion assessment and home monitoring if needed. Thank you.

## 2021-12-20 NOTE — TELEPHONE ENCOUNTER
1st attempt/left voice mail message for pt to call back. When the patient returns the call please see doctors message below and inform the patient. Also, please schedule the appointment.
FYI: Patient has an appointment already.
Fine to [facility] to accommodate patient on July 8, 2021, I spoke to the patient and she accepted appointment on that day at 10:40am, please place this patient on my schedule
LMTCB
Pt called to schedule appt to discuss her anxiety and medication, she is scheduled for 6/29, but is requesting a call from Dr Cyn Flores when available. She did not want to speak with nurse.
Pt returned call, stated voicemail said ok to book next week. Wanted to verify if ok to use a res24 slot on 6/8 when pt is off.
Yes

## 2021-12-21 NOTE — TELEPHONE ENCOUNTER
Home Monitoring Day 1    What  was your temp today? - no temp    How did you take your temp?     with an oral thermometer    What was your pulse ox today?  n/a    Are you feeling short of breath today?    No      Is the shortness of breath better, the same,

## 2021-12-23 ENCOUNTER — TELEMEDICINE (OUTPATIENT)
Dept: INTERNAL MEDICINE CLINIC | Facility: CLINIC | Age: 41
End: 2021-12-23
Payer: MEDICAID

## 2021-12-23 DIAGNOSIS — I10 ESSENTIAL HYPERTENSION: Primary | ICD-10-CM

## 2021-12-23 DIAGNOSIS — U07.1 COVID-19: ICD-10-CM

## 2021-12-23 PROCEDURE — 99213 OFFICE O/P EST LOW 20 MIN: CPT | Performed by: NURSE PRACTITIONER

## 2021-12-23 NOTE — PROGRESS NOTES
Video Check-In    Newtonvilletonny Pak verbally consents to a Video Check-In visit on 12/23/21. Patient understands and accepts financial responsibility for any deductible, co-insurance and/or co-pays associated with this service.     Gail Wilson is daily., Disp: 60 tablet, Rfl: 0  •  metoprolol succinate 50 MG Oral Tablet 24 Hr, Take 1 tablet (50 mg total) by mouth daily. , Disp: 90 tablet, Rfl: 1    Summary of topics discussed: IJune, spoke with pt.      HPI    Onset/duration of current s 09:26 AM     12/06/2021 09:26 AM    CO2 28.0 12/06/2021 09:26 AM    CREATSERUM 0.73 12/06/2021 09:26 AM    CA 9.1 12/06/2021 09:26 AM    AST 14 (L) 11/09/2021 09:13 AM    ALT 19 11/09/2021 09:13 AM    TSH 2.100 12/06/2021 09:26 AM    T4F 1.0 09/15/20 Follow up-prn  Please call if symptoms worsen or are not resolving. Darwin Hinojosa advised to follow CDC guidelines for self isolation and symptomatic treatment as outlined on CDC Patient Guidelines.  Darwin Hinojosa understands phon fried foods. No salt. Use other seasonings. COVID-19  • Patient directed to isolate away form any household members as much as possible and the general public COMPLETELY for 3 days after symptoms resolve, or 10 days total (whichever is longer).    • Jcarlos sufficient and adequate time. This billing was spent on reviewing labs, medications, radiological test and decision making. Appropriate medical decision making and tests are ordered as detailed in the plan of care above.     Jamil Larios, APRN

## 2021-12-23 NOTE — PATIENT INSTRUCTIONS
Diagnoses and all orders for this visit:    Essential hypertension  Careful with diet and excercise at least 30 minutes 3-4 times a week. Check blood pressures at different times on different days. Can purchase own blood pressure monitor.  If not, check at

## 2022-01-14 ENCOUNTER — NURSE TRIAGE (OUTPATIENT)
Dept: INTERNAL MEDICINE CLINIC | Facility: CLINIC | Age: 42
End: 2022-01-14

## 2022-01-14 NOTE — TELEPHONE ENCOUNTER
Action Requested: Summary for Provider     []  Critical Lab, Recommendations Needed  [x] Need Additional Advice  []   FYI    []   Need Orders  [] Need Medications Sent to Pharmacy  []  Other     SUMMARY: Patient has concerns about side effects from the [facility]

## 2022-01-14 NOTE — TELEPHONE ENCOUNTER
Spoke to patient, she is feeling better, she feels that combination of energy drink and not eating properly 3 gets lightheadedness, we discussed not to have energy drinks which have high load of caffeine, can have 1 coffee in the morning with food or even

## 2022-01-18 ENCOUNTER — TELEMEDICINE (OUTPATIENT)
Dept: FAMILY MEDICINE CLINIC | Facility: CLINIC | Age: 42
End: 2022-01-18
Payer: MEDICAID

## 2022-01-18 ENCOUNTER — NURSE TRIAGE (OUTPATIENT)
Dept: INTERNAL MEDICINE CLINIC | Facility: CLINIC | Age: 42
End: 2022-01-18

## 2022-01-18 DIAGNOSIS — R05.9 COUGH: Primary | ICD-10-CM

## 2022-01-18 PROCEDURE — 99202 OFFICE O/P NEW SF 15 MIN: CPT | Performed by: PHYSICIAN ASSISTANT

## 2022-01-18 RX ORDER — BENZONATATE 200 MG/1
200 CAPSULE ORAL 3 TIMES DAILY PRN
Qty: 30 CAPSULE | Refills: 0 | Status: SHIPPED | OUTPATIENT
Start: 2022-01-18

## 2022-01-18 NOTE — TELEPHONE ENCOUNTER
Action Requested: Summary for Provider     []  Critical Lab, Recommendations Needed  [] Need Additional Advice  []   FYI    []   Need Orders  [] Need Medications Sent to Pharmacy  []  Other     SUMMARY: Patient scheduled for video visit today at 6:15 p.m.

## 2022-01-19 NOTE — PROGRESS NOTES
HPI: HPI    I spoke to Ketan Burris via video call, verified date of birth, and discussed current concerns. Patient tested positive for Covid on 12/15/21. Patient complains of dry cough. Patient returned to work today.  Patient has tried coug • Depression    • Essential hypertension    • Headache     headaches   • Hypertension complicating childbirth    • Hypothyroidism    •  (normal spontaneous vaginal delivery)     female   • Pre-diabetes    • Pregnancy     EAB  / Activity: Not on file  Stress: Not on file  Social Connections: Not on file  Intimate Partner Violence: Not on file  Housing Stability: Not on file    Review of Systems:   Review of Systems   Constitutional: Negative for activity change, chills, fatigue an

## 2022-01-20 ENCOUNTER — NURSE TRIAGE (OUTPATIENT)
Dept: INTERNAL MEDICINE CLINIC | Facility: CLINIC | Age: 42
End: 2022-01-20

## 2022-01-20 NOTE — TELEPHONE ENCOUNTER
Patient was positive for COVID on 12/15/2021. Returned to work on 1/18/2022. Patient still has a cough. Sneezing occasionally. Coughing so much that her head hurts. Taking excedrin for the headache. Short of breath when going up and down the stairs.  No chante

## 2022-01-21 NOTE — TELEPHONE ENCOUNTER
pt is returning your call pt stated that she will be waiting for your call. She will like for the cough medication to be sent to the [address] as she is working there today. Pt will have her phone by her awaiting your call.

## 2022-07-19 ENCOUNTER — HOSPITAL ENCOUNTER (OUTPATIENT)
Age: 42
Discharge: HOME | End: 2022-07-19
Attending: EMERGENCY MEDICINE
Payer: MEDICAID

## 2022-07-19 VITALS
SYSTOLIC BLOOD PRESSURE: 157 MMHG | TEMPERATURE: 97 F | DIASTOLIC BLOOD PRESSURE: 91 MMHG | RESPIRATION RATE: 18 BRPM | OXYGEN SATURATION: 96 % | HEART RATE: 84 BPM

## 2022-07-19 DIAGNOSIS — J06.9 VIRAL URI WITH COUGH: Primary | ICD-10-CM

## 2022-07-19 LAB
S PYO AG THROAT QL: NEGATIVE
SARS-COV-2 RNA RESP QL NAA+PROBE: NOT DETECTED

## 2022-07-19 PROCEDURE — 99213 OFFICE O/P EST LOW 20 MIN: CPT

## 2022-07-19 PROCEDURE — 87880 STREP A ASSAY W/OPTIC: CPT

## 2022-07-19 RX ORDER — BENZONATATE 100 MG/1
100 CAPSULE ORAL 3 TIMES DAILY PRN
Qty: 30 CAPSULE | Refills: 0 | Status: SHIPPED | OUTPATIENT
Start: 2022-07-19 | End: 2022-08-18

## 2022-07-19 NOTE — ED INITIAL ASSESSMENT (HPI)
Pt c/o nasal congestion, post-nasal drip, bilateral ear discomfort and cough x 2 days.  Denies fever

## 2022-07-29 ENCOUNTER — NURSE TRIAGE (OUTPATIENT)
Dept: INTERNAL MEDICINE CLINIC | Facility: CLINIC | Age: 42
End: 2022-07-29

## 2022-10-13 ENCOUNTER — TELEPHONE (OUTPATIENT)
Dept: INTERNAL MEDICINE CLINIC | Facility: CLINIC | Age: 42
End: 2022-10-13

## 2022-10-13 ENCOUNTER — APPOINTMENT (OUTPATIENT)
Dept: GENERAL RADIOLOGY | Age: 42
End: 2022-10-13
Attending: NURSE PRACTITIONER
Payer: MEDICAID

## 2022-10-13 ENCOUNTER — HOSPITAL ENCOUNTER (OUTPATIENT)
Age: 42
Discharge: HOME | End: 2022-10-13
Payer: MEDICAID

## 2022-10-13 VITALS
HEART RATE: 72 BPM | DIASTOLIC BLOOD PRESSURE: 66 MMHG | TEMPERATURE: 98 F | OXYGEN SATURATION: 98 % | RESPIRATION RATE: 22 BRPM | SYSTOLIC BLOOD PRESSURE: 129 MMHG

## 2022-10-13 DIAGNOSIS — J06.9 VIRAL UPPER RESPIRATORY ILLNESS: Primary | ICD-10-CM

## 2022-10-13 LAB
#MXD IC: 0.5 X10ˆ3/UL (ref 0.1–1)
BUN BLD-MCNC: 11 MG/DL (ref 7–18)
CHLORIDE BLD-SCNC: 103 MMOL/L (ref 98–112)
CO2 BLD-SCNC: 24 MMOL/L (ref 21–32)
CREAT BLD-MCNC: 0.6 MG/DL
GFR SERPLBLD BASED ON 1.73 SQ M-ARVRAT: 113 ML/MIN/1.73M2 (ref 60–?)
GLUCOSE BLD-MCNC: 149 MG/DL (ref 70–99)
HCT VFR BLD AUTO: 42.3 %
HCT VFR BLD CALC: 45 %
HGB BLD-MCNC: 13.8 G/DL
ISTAT IONIZED CALCIUM FOR CHEM 8: 1.15 MMOL/L (ref 1.12–1.32)
LYMPHOCYTES # BLD AUTO: 2.4 X10ˆ3/UL (ref 1–4)
LYMPHOCYTES NFR BLD AUTO: 24.7 %
MCH RBC QN AUTO: 27.2 PG (ref 26–34)
MCHC RBC AUTO-ENTMCNC: 32.6 G/DL (ref 31–37)
MCV RBC AUTO: 83.3 FL (ref 80–100)
MIXED CELL %: 5.5 %
NEUTROPHILS # BLD AUTO: 6.7 X10ˆ3/UL (ref 1.5–7.7)
NEUTROPHILS NFR BLD AUTO: 69.8 %
PLATELET # BLD AUTO: 317 X10ˆ3/UL (ref 150–450)
POTASSIUM BLD-SCNC: 4.2 MMOL/L (ref 3.6–5.1)
RBC # BLD AUTO: 5.08 X10ˆ6/UL
SARS-COV-2 RNA RESP QL NAA+PROBE: NOT DETECTED
SODIUM BLD-SCNC: 137 MMOL/L (ref 136–145)
TROPONIN I BLD-MCNC: <0.02 NG/ML
WBC # BLD AUTO: 9.6 X10ˆ3/UL (ref 4–11)

## 2022-10-13 PROCEDURE — 99215 OFFICE O/P EST HI 40 MIN: CPT

## 2022-10-13 PROCEDURE — 93010 ELECTROCARDIOGRAM REPORT: CPT | Performed by: NURSE PRACTITIONER

## 2022-10-13 PROCEDURE — 99213 OFFICE O/P EST LOW 20 MIN: CPT

## 2022-10-13 PROCEDURE — 36415 COLL VENOUS BLD VENIPUNCTURE: CPT

## 2022-10-13 PROCEDURE — 80047 BASIC METABLC PNL IONIZED CA: CPT

## 2022-10-13 PROCEDURE — 71046 X-RAY EXAM CHEST 2 VIEWS: CPT | Performed by: NURSE PRACTITIONER

## 2022-10-13 PROCEDURE — 85025 COMPLETE CBC W/AUTO DIFF WBC: CPT | Performed by: NURSE PRACTITIONER

## 2022-10-13 PROCEDURE — 93005 ELECTROCARDIOGRAM TRACING: CPT

## 2022-10-13 PROCEDURE — 84484 ASSAY OF TROPONIN QUANT: CPT

## 2022-10-13 NOTE — ED INITIAL ASSESSMENT (HPI)
Presents with cough and congestion for 6-7 days. No fever. Home covid test negative. Now with midsternal chest pain for 2 days, intermittent wheezing, and SOB.

## 2022-10-13 NOTE — TELEPHONE ENCOUNTER
Spoke with pt,  verified  Pt was dx'd with bronchitis today at [address] [place] [address] and they didn't give her any meds. She was told it will just run its course for 6-8 weeks. Pt is looking for f/u with PCP and antibiotics. F/u appt made as requested.        FYI      Future Appointments   Date Time Provider Jordana Donna   10/14/2022  4:40 PM [facility]

## 2022-10-14 ENCOUNTER — OFFICE VISIT (OUTPATIENT)
Dept: INTERNAL MEDICINE CLINIC | Facility: CLINIC | Age: 42
End: 2022-10-14
Payer: MEDICAID

## 2022-10-14 VITALS
HEART RATE: 77 BPM | DIASTOLIC BLOOD PRESSURE: 86 MMHG | SYSTOLIC BLOOD PRESSURE: 137 MMHG | HEIGHT: 66 IN | BODY MASS INDEX: 44.2 KG/M2 | OXYGEN SATURATION: 99 % | WEIGHT: 275 LBS

## 2022-10-14 DIAGNOSIS — I10 ESSENTIAL HYPERTENSION: ICD-10-CM

## 2022-10-14 DIAGNOSIS — Z12.31 SCREENING MAMMOGRAM FOR BREAST CANCER: ICD-10-CM

## 2022-10-14 DIAGNOSIS — E55.9 VITAMIN D DEFICIENCY: ICD-10-CM

## 2022-10-14 DIAGNOSIS — J04.11 ACUTE TRACHEITIS WITH OBSTRUCTION: Primary | ICD-10-CM

## 2022-10-14 DIAGNOSIS — R73.03 PREDIABETES: ICD-10-CM

## 2022-10-14 PROCEDURE — 99214 OFFICE O/P EST MOD 30 MIN: CPT | Performed by: INTERNAL MEDICINE

## 2022-10-14 PROCEDURE — 3075F SYST BP GE 130 - 139MM HG: CPT | Performed by: INTERNAL MEDICINE

## 2022-10-14 PROCEDURE — 3079F DIAST BP 80-89 MM HG: CPT | Performed by: INTERNAL MEDICINE

## 2022-10-14 PROCEDURE — 3008F BODY MASS INDEX DOCD: CPT | Performed by: INTERNAL MEDICINE

## 2022-10-14 RX ORDER — ALBUTEROL SULFATE 90 UG/1
2 AEROSOL, METERED RESPIRATORY (INHALATION) EVERY 6 HOURS PRN
Qty: 1 EACH | Refills: 1 | Status: SHIPPED | OUTPATIENT
Start: 2022-10-14

## 2022-10-14 RX ORDER — AZITHROMYCIN 250 MG/1
TABLET, FILM COATED ORAL
Qty: 6 TABLET | Refills: 0 | Status: SHIPPED | OUTPATIENT
Start: 2022-10-14 | End: 2022-10-19

## 2022-10-14 RX ORDER — DEXTROAMPHETAMINE SACCHARATE, AMPHETAMINE ASPARTATE, DEXTROAMPHETAMINE SULFATE AND AMPHETAMINE SULFATE 2.5; 2.5; 2.5; 2.5 MG/1; MG/1; MG/1; MG/1
TABLET ORAL
COMMUNITY
Start: 2022-10-09

## 2022-10-21 ENCOUNTER — LAB ENCOUNTER (OUTPATIENT)
Dept: LAB | Age: 42
End: 2022-10-21
Attending: INTERNAL MEDICINE
Payer: MEDICAID

## 2022-10-21 DIAGNOSIS — E55.9 VITAMIN D DEFICIENCY: ICD-10-CM

## 2022-10-21 DIAGNOSIS — I10 ESSENTIAL HYPERTENSION: ICD-10-CM

## 2022-10-21 LAB
ALBUMIN SERPL-MCNC: 3.4 G/DL (ref 3.4–5)
ALBUMIN/GLOB SERPL: 0.9 {RATIO} (ref 1–2)
ALP LIVER SERPL-CCNC: 95 U/L
ALT SERPL-CCNC: 39 U/L
ANION GAP SERPL CALC-SCNC: 7 MMOL/L (ref 0–18)
AST SERPL-CCNC: 19 U/L (ref 15–37)
BILIRUB SERPL-MCNC: 0.9 MG/DL (ref 0.1–2)
BUN BLD-MCNC: 10 MG/DL (ref 7–18)
BUN/CREAT SERPL: 16.4 (ref 10–20)
CALCIUM BLD-MCNC: 9 MG/DL (ref 8.5–10.1)
CHLORIDE SERPL-SCNC: 106 MMOL/L (ref 98–112)
CHOLEST SERPL-MCNC: 158 MG/DL (ref ?–200)
CO2 SERPL-SCNC: 24 MMOL/L (ref 21–32)
CREAT BLD-MCNC: 0.61 MG/DL
EST. AVERAGE GLUCOSE BLD GHB EST-MCNC: 128 MG/DL (ref 68–126)
FASTING PATIENT LIPID ANSWER: YES
FASTING STATUS PATIENT QL REPORTED: YES
GFR SERPLBLD BASED ON 1.73 SQ M-ARVRAT: 113 ML/MIN/1.73M2 (ref 60–?)
GLOBULIN PLAS-MCNC: 3.8 G/DL (ref 2.8–4.4)
GLUCOSE BLD-MCNC: 109 MG/DL (ref 70–99)
HBA1C MFR BLD: 6.1 % (ref ?–5.7)
HDLC SERPL-MCNC: 39 MG/DL (ref 40–59)
LDLC SERPL CALC-MCNC: 99 MG/DL (ref ?–100)
NONHDLC SERPL-MCNC: 119 MG/DL (ref ?–130)
OSMOLALITY SERPL CALC.SUM OF ELEC: 284 MOSM/KG (ref 275–295)
POTASSIUM SERPL-SCNC: 4 MMOL/L (ref 3.5–5.1)
PROT SERPL-MCNC: 7.2 G/DL (ref 6.4–8.2)
SODIUM SERPL-SCNC: 137 MMOL/L (ref 136–145)
TRIGL SERPL-MCNC: 110 MG/DL (ref 30–149)
TSI SER-ACNC: 1.64 MIU/ML (ref 0.36–3.74)
VIT D+METAB SERPL-MCNC: 21 NG/ML (ref 30–100)
VLDLC SERPL CALC-MCNC: 18 MG/DL (ref 0–30)

## 2022-10-21 PROCEDURE — 80061 LIPID PANEL: CPT | Performed by: INTERNAL MEDICINE

## 2022-10-21 PROCEDURE — 82306 VITAMIN D 25 HYDROXY: CPT

## 2022-10-21 PROCEDURE — 84443 ASSAY THYROID STIM HORMONE: CPT | Performed by: INTERNAL MEDICINE

## 2022-10-21 PROCEDURE — 80053 COMPREHEN METABOLIC PANEL: CPT | Performed by: INTERNAL MEDICINE

## 2022-10-21 PROCEDURE — 83036 HEMOGLOBIN GLYCOSYLATED A1C: CPT | Performed by: INTERNAL MEDICINE

## 2022-10-21 PROCEDURE — 85025 COMPLETE CBC W/AUTO DIFF WBC: CPT | Performed by: INTERNAL MEDICINE

## 2022-10-21 PROCEDURE — 36415 COLL VENOUS BLD VENIPUNCTURE: CPT | Performed by: INTERNAL MEDICINE

## 2022-10-24 ENCOUNTER — TELEPHONE (OUTPATIENT)
Dept: INTERNAL MEDICINE CLINIC | Facility: CLINIC | Age: 42
End: 2022-10-24

## 2022-10-24 NOTE — TELEPHONE ENCOUNTER
Patient calling for test results-    Verified name and . Patient was advised of the provider's result notes. Patient verbalizes understanding and agrees with plan.      Normal kidney liver function test, normal cholesterol, hemoglobin A1c 6.1 stays in prediabetic range, thyroid function test is normal, continue to follow low-fat low-cholesterol diet, continue same levothyroxine   Written by Ji Sim MD on 10/22/2022 12:35 AM CDT

## 2022-10-27 ENCOUNTER — TELEPHONE (OUTPATIENT)
Dept: INTERNAL MEDICINE CLINIC | Facility: CLINIC | Age: 42
End: 2022-10-27

## 2022-10-27 RX ORDER — LISINOPRIL 5 MG/1
5 TABLET ORAL DAILY
Qty: 90 TABLET | Refills: 1 | Status: SHIPPED | OUTPATIENT
Start: 2022-10-27

## 2022-10-27 RX ORDER — LEVOTHYROXINE SODIUM 0.05 MG/1
50 TABLET ORAL DAILY
Qty: 90 TABLET | Refills: 1 | Status: SHIPPED | OUTPATIENT
Start: 2022-10-27

## 2022-10-27 NOTE — TELEPHONE ENCOUNTER
Patient called (identified name and ),   Had requested refills. Received message to check MyChart but could not log in. Informed her she had refills sent to pharmacy for levothyroxine and lisinopril. Patient verbalized understanding.

## 2022-10-27 NOTE — TELEPHONE ENCOUNTER
Refill passed per [address] protocol. .  Requested Prescriptions   Pending Prescriptions Disp Refills    levothyroxine 50 MCG Oral Tab 90 tablet 1     Sig: Take 1 tablet (50 mcg total) by mouth in the morning. Thyroid Medication Protocol Passed - 10/27/2022  8:51 AM        Passed - TSH in past 12 months        Passed - Last TSH value is normal     Lab Results   Component Value Date    TSH 1.640 10/21/2022    THYROIDFUNC 7.74 (H) 12/22/2015                 Passed - In person appointment or virtual visit in the past 12 mos or appointment in next 3 mos     Recent Outpatient Visits              1 week ago Acute tracheitis with obstruction    [facility], [facility], Anabela Camilo MD    Office Visit    9 months ago Cough    [facility] Delgado Monson PA-C    Telemedicine    10 months ago Essential hypertension    [address] [place], [address], [address] [facility], [facility], [address] [place] [address]    10 months ago [address] [place] [address], [address] [place] [address], [address] [place] [address]    11 months ago Other fatigue    [address] [place], [address], [place] Livers, [facility], APRN    Telemedicine                        lisinopril 5 MG Oral Tab 90 tablet 1     Sig: Take 1 tablet (5 mg total) by mouth in the morning.        Hypertensive Medications Protocol Passed - 10/27/2022  8:51 AM        Passed - In person appointment in the past 12 or next 3 months     Recent Outpatient Visits              1 week ago Acute tracheitis with obstruction    Torey Bragg MD    Office Visit    9 months ago Cough    [address] [facility] Delgado Monson PA-C    Telemedicine    10 months ago Essential hypertension    [address] [place], [address], [address], [address], [address] [place] [address]    10 months ago [address] [place] [address], [address] [place] [address]    Telemedicine    11 months ago Other fatigue    [facility], [address], Fernando [facility], FARIDEH Ruiz    Telemedicine                      Passed - Last BP reading less than 140/90     BP Readings from Last 1 Encounters:  10/14/22 : 137/86              Passed - CMP or BMP in past 6 months     Recent Results (from the past 4392 hour(s))   COMP METABOLIC PANEL (14)    Collection Time: 10/21/22  9:07 AM   Result Value Ref Range    Glucose 109 (H) 70 - 99 mg/dL    Sodium 137 136 - 145 mmol/L    Potassium 4.0 3.5 - 5.1 mmol/L    Chloride 106 98 - 112 mmol/L    CO2 24.0 21.0 - 32.0 mmol/L    Anion Gap 7 0 - 18 mmol/L    BUN 10 7 - 18 mg/dL    Creatinine 0.61 0.55 - 1.02 mg/dL    BUN/CREA Ratio 16.4 10.0 - 20.0    Calcium, Total 9.0 8.5 - 10.1 mg/dL    Calculated Osmolality 284 275 - 295 mOsm/kg    eGFR-Cr 113 >=60 mL/min/1.73m2    ALT 39 13 - 56 U/L    AST 19 15 - 37 U/L    Alkaline Phosphatase 95 37 - 98 U/L    Bilirubin, Total 0.9 0.1 - 2.0 mg/dL    Total Protein 7.2 6.4 - 8.2 g/dL    Albumin 3.4 3.4 - 5.0 g/dL    Globulin  3.8 2.8 - 4.4 g/dL    A/G Ratio 0.9 (L) 1.0 - 2.0    Patient Fasting for CMP? Yes      *Note: Due to a large number of results and/or encounters for the requested time period, some results have not been displayed. A complete set of results can be found in Results Review.                Passed - In person appointment or virtual visit in the past 6 months     Recent Outpatient Visits              1 week ago Acute tracheitis with obstruction    Paresh Cazares MD    Office Visit    9 months ago Cough    [address] Deyanira Martinez PA-C    Telemedicine    10 months ago Essential hypertension    [facility], [address], Sindhu Wilkes, Carmine Lynn, [address] [place] [address]    10 months ago [address] [facility] [address] [facility] [address], LYNNELADouglas SMITH, [address] [place] [address]    11 months ago Other fatigue    [facility], [address], [address], Vangie Capps, APRFANNIE    Telemedicine                      Passed [facility] or GFRNAA > 50     GFR Evaluation  EGFRCR: 113 , resulted on 10/21/2022               Recent Outpatient Visits              1 week ago Acute tracheitis with obstruction    [facility], 12 [facility], Anabela Buitrago MD    Office Visit    9 months ago Cough    [address] Agatha Dee PA-C    Telemedicine    10 months ago Essential hypertension    [address], [address], Fernando Torrez, [address] [place] [address]    10 months ago [facility], [facility], [address] [place] [address]    11 months ago Other fatigue    [address], [address], [address], Vangie Capps, [address] [place] [address]

## 2022-10-27 NOTE — TELEPHONE ENCOUNTER
Refill passed per [address] protocol. .  Requested Prescriptions   Pending Prescriptions Disp Refills    levothyroxine 50 MCG Oral Tab 90 tablet 1     Sig: Take 1 tablet (50 mcg total) by mouth in the morning. Thyroid Medication Protocol Passed - 10/27/2022  8:51 AM        Passed - TSH in past 12 months        Passed - Last TSH value is normal     Lab Results   Component Value Date    TSH 1.640 10/21/2022    THYROIDFUNC 7.74 (H) 12/22/2015                 Passed - In person appointment or virtual visit in the past 12 mos or appointment in next 3 mos     Recent Outpatient Visits              1 week ago Acute tracheitis with obstruction    [facility], [facility], Anabela Heller MD    Office Visit    9 months ago Cough    [address] [facility] Conner Dillon PA-C    Telemedicine    10 months ago Essential hypertension    [address] [place], [address], [place] [address], [place] [address], [address] [place] [address]    10 months ago [address] [place] [address], [address] [place] [address], [address] [place] [address]    11 months ago Other fatigue    [address] [place], [address], [place] [address], APRN    Telemedicine                        lisinopril 5 MG Oral Tab 90 tablet 1     Sig: Take 1 tablet (5 mg total) by mouth in the morning.        Hypertensive Medications Protocol Passed - 10/27/2022  8:51 AM        Passed - In person appointment in the past 12 or next 3 months     Recent Outpatient Visits              1 week ago Acute tracheitis with obstruction    Katie Conde MD    Office Visit    9 months ago Cough    [address] [facility] Conner Dillon PA-C    Telemedicine    10 months ago Essential hypertension    [address] [place], [address], [address] [facility], [facility], [address] [place] [address]    10 months ago [address] [place] [address], [address] [place] [address]    Telemedicine    11 months ago Other fatigue    [employer], [address], Fernando [facility] APR[facility]    Telemedicine                      Passed - Last BP reading less than 140/90     BP Readings from Last 1 Encounters:  10/14/22 : 137/86              Passed - CMP or BMP in past 6 months     Recent Results (from the past 4392 hour(s))   COMP METABOLIC PANEL (14)    Collection Time: 10/21/22  9:07 AM   Result Value Ref Range    Glucose 109 (H) 70 - 99 mg/dL    Sodium 137 136 - 145 mmol/L    Potassium 4.0 3.5 - 5.1 mmol/L    Chloride 106 98 - 112 mmol/L    CO2 24.0 21.0 - 32.0 mmol/L    Anion Gap 7 0 - 18 mmol/L    BUN 10 7 - 18 mg/dL    Creatinine 0.61 0.55 - 1.02 mg/dL    BUN/CREA Ratio 16.4 10.0 - 20.0    Calcium, Total 9.0 8.5 - 10.1 mg/dL    Calculated Osmolality 284 275 - 295 mOsm/kg    eGFR-Cr 113 >=60 mL/min/1.73m2    ALT 39 13 - 56 U/L    AST 19 15 - 37 U/L    Alkaline Phosphatase 95 37 - 98 U/L    Bilirubin, Total 0.9 0.1 - 2.0 mg/dL    Total Protein 7.2 6.4 - 8.2 g/dL    Albumin 3.4 3.4 - 5.0 g/dL    Globulin  3.8 2.8 - 4.4 g/dL    A/G Ratio 0.9 (L) 1.0 - 2.0    Patient Fasting for CMP? Yes      *Note: Due to a large number of results and/or encounters for the requested time period, some results have not been displayed. A complete set of results can be found in Results Review.                Passed - In person appointment or virtual visit in the past 6 months     Recent Outpatient Visits              1 week ago Acute tracheitis with obstruction    Scarlett Sharpe MD    Office Visit    9 months ago Cough    [address] Dennis Courtney PA-C    Telemedicine    10 months ago Essential hypertension    [employer], [address], Sindhu Wilkes, Regan Drake, [address] [place] [address]    10 months ago [facility], [address] [place] [address] Douglas Rugg[address]    11 months ago Other fatigue    [employer], [address], [address], FARIDEH Staley    Telemedicine                      Passed [facility] or GFRNAA > 50     GFR Evaluation  EGFRCR: 113 , resulted on 10/21/2022               Recent Outpatient Visits              1 week ago Acute tracheitis with obstruction    [facility], [facility], Anabela Medellin MD    Office Visit    9 months ago Cough    [address] [facility] Ramos Braga PA-C    Telemedicine    10 months ago Essential hypertension    [employer], [address], [place] Juanda Carrel, [address] [place] [address]    10 months ago [facility], [address] [place] [address], [address] [place] [address]    11 months ago Other fatigue    [employer], [address], [address], Teresita Morales, [address] [place] [address]

## 2023-01-17 ENCOUNTER — TELEPHONE (OUTPATIENT)
Dept: INTERNAL MEDICINE CLINIC | Facility: CLINIC | Age: 43
End: 2023-01-17

## 2023-01-17 NOTE — TELEPHONE ENCOUNTER
Patient states she went to the eye doctor yesterday and was told to contact her primary care doctor about getting lab work done to check for diabetes. Patient states her vision worsened and  prescription changed a lot in one year. Patient states she scheduled an appointment with Frankey Lou.     Future Appointments   Date Time Provider Jordana Thompson   1/25/2023  3:00 PM PAVAN MALONEY [facility] [place]   3/1/2023  4:40 PM Jacob Gibbs MD [address]

## 2023-01-21 ENCOUNTER — LAB ENCOUNTER (OUTPATIENT)
Dept: LAB | Age: 43
End: 2023-01-21
Attending: INTERNAL MEDICINE
Payer: MEDICAID

## 2023-01-21 PROCEDURE — 36415 COLL VENOUS BLD VENIPUNCTURE: CPT | Performed by: INTERNAL MEDICINE

## 2023-01-21 PROCEDURE — 85025 COMPLETE CBC W/AUTO DIFF WBC: CPT | Performed by: INTERNAL MEDICINE

## 2023-01-21 PROCEDURE — 83036 HEMOGLOBIN GLYCOSYLATED A1C: CPT | Performed by: INTERNAL MEDICINE

## 2023-01-21 PROCEDURE — 80053 COMPREHEN METABOLIC PANEL: CPT | Performed by: INTERNAL MEDICINE

## 2023-01-23 ENCOUNTER — TELEPHONE (OUTPATIENT)
Dept: INTERNAL MEDICINE CLINIC | Facility: CLINIC | Age: 43
End: 2023-01-23

## 2023-01-31 RX ORDER — LEVOTHYROXINE SODIUM 0.05 MG/1
50 TABLET ORAL DAILY
Qty: 90 TABLET | Refills: 1 | OUTPATIENT
Start: 2023-01-31

## 2023-01-31 RX ORDER — LISINOPRIL 5 MG/1
5 TABLET ORAL DAILY
Qty: 90 TABLET | Refills: 1 | OUTPATIENT
Start: 2023-01-31

## 2023-01-31 NOTE — TELEPHONE ENCOUNTER
Duplicate request, previously addressed. See medication record, with refill available     [address] and spoke with Janeth Kirk, confirmed that patient still has refill available, , instructed to prepare it and patient will pick it up. Informed patient above informations , stated understanding.

## 2023-03-01 ENCOUNTER — OFFICE VISIT (OUTPATIENT)
Dept: INTERNAL MEDICINE CLINIC | Facility: CLINIC | Age: 43
End: 2023-03-01

## 2023-03-01 VITALS
BODY MASS INDEX: 42.27 KG/M2 | RESPIRATION RATE: 20 BRPM | DIASTOLIC BLOOD PRESSURE: 84 MMHG | HEIGHT: 66 IN | SYSTOLIC BLOOD PRESSURE: 131 MMHG | TEMPERATURE: 98 F | WEIGHT: 263 LBS | HEART RATE: 85 BPM

## 2023-03-01 DIAGNOSIS — Z00.00 PHYSICAL EXAM, ANNUAL: Primary | ICD-10-CM

## 2023-03-01 DIAGNOSIS — E55.9 VITAMIN D DEFICIENCY: ICD-10-CM

## 2023-03-01 DIAGNOSIS — E66.01 CLASS 3 SEVERE OBESITY DUE TO EXCESS CALORIES WITHOUT SERIOUS COMORBIDITY WITH BODY MASS INDEX (BMI) OF 40.0 TO 44.9 IN ADULT (HCC): ICD-10-CM

## 2023-03-01 PROCEDURE — 3008F BODY MASS INDEX DOCD: CPT | Performed by: INTERNAL MEDICINE

## 2023-03-01 PROCEDURE — 99396 PREV VISIT EST AGE 40-64: CPT | Performed by: INTERNAL MEDICINE

## 2023-03-01 PROCEDURE — 3079F DIAST BP 80-89 MM HG: CPT | Performed by: INTERNAL MEDICINE

## 2023-03-01 PROCEDURE — 3075F SYST BP GE 130 - 139MM HG: CPT | Performed by: INTERNAL MEDICINE

## 2023-03-01 RX ORDER — DEXTROAMPHETAMINE SACCHARATE, AMPHETAMINE ASPARTATE MONOHYDRATE, DEXTROAMPHETAMINE SULFATE AND AMPHETAMINE SULFATE 5; 5; 5; 5 MG/1; MG/1; MG/1; MG/1
CAPSULE, EXTENDED RELEASE ORAL
COMMUNITY
Start: 2023-02-15

## 2023-03-10 ENCOUNTER — NURSE TRIAGE (OUTPATIENT)
Dept: INTERNAL MEDICINE CLINIC | Facility: CLINIC | Age: 43
End: 2023-03-10

## 2023-03-10 ENCOUNTER — PATIENT MESSAGE (OUTPATIENT)
Dept: INTERNAL MEDICINE CLINIC | Facility: CLINIC | Age: 43
End: 2023-03-10

## 2023-03-10 NOTE — TELEPHONE ENCOUNTER
I placed order for analysis with reflex to culture, she can do blood testing test anytime. Please let patient know that medication Wegovy injections for weight loss was denied by her insurance company.

## 2023-03-14 ENCOUNTER — TELEPHONE (OUTPATIENT)
Dept: INTERNAL MEDICINE CLINIC | Facility: CLINIC | Age: 43
End: 2023-03-14

## 2023-03-14 NOTE — TELEPHONE ENCOUNTER
Faxed over twice patient prior authorization to triage support at [phone]. Received conformation.

## 2023-03-20 ENCOUNTER — TELEPHONE (OUTPATIENT)
Dept: INTERNAL MEDICINE CLINIC | Facility: CLINIC | Age: 43
End: 2023-03-20

## 2023-03-20 DIAGNOSIS — R73.03 PREDIABETES: ICD-10-CM

## 2023-03-20 DIAGNOSIS — E66.01 CLASS 3 SEVERE OBESITY DUE TO EXCESS CALORIES WITHOUT SERIOUS COMORBIDITY WITH BODY MASS INDEX (BMI) OF 40.0 TO 44.9 IN ADULT (HCC): Primary | ICD-10-CM

## 2023-03-20 LAB
ABSOLUTE BASOPHILS: 57 CELLS/UL (ref 0–200)
ABSOLUTE EOSINOPHILS: 171 CELLS/UL (ref 15–500)
ABSOLUTE LYMPHOCYTES: 2417 CELLS/UL (ref 850–3900)
ABSOLUTE MONOCYTES: 604 CELLS/UL (ref 200–950)
ABSOLUTE NEUTROPHILS: 8151 CELLS/UL (ref 1500–7800)
ALBUMIN/GLOBULIN RATIO: 1.4 (CALC) (ref 1–2.5)
ALBUMIN: 3.9 G/DL (ref 3.6–5.1)
ALKALINE PHOSPHATASE: 93 U/L (ref 31–125)
ALT: 15 U/L (ref 6–29)
APPEARANCE: CLEAR
AST: 14 U/L (ref 10–30)
BASOPHILS: 0.5 %
BILIRUBIN, TOTAL: 0.5 MG/DL (ref 0.2–1.2)
BILIRUBIN: NEGATIVE
BUN: 18 MG/DL (ref 7–25)
CALCIUM: 9 MG/DL (ref 8.6–10.2)
CARBON DIOXIDE: 25 MMOL/L (ref 20–32)
CHLORIDE: 104 MMOL/L (ref 98–110)
CHOL/HDLC RATIO: 3.2 (CALC)
CHOLESTEROL, TOTAL: 179 MG/DL
COLOR: YELLOW
CREATININE: 0.63 MG/DL (ref 0.5–0.99)
EGFR: 112 ML/MIN/1.73M2
EOSINOPHILS: 1.5 %
GLOBULIN: 2.8 G/DL (CALC) (ref 1.9–3.7)
GLUCOSE: 105 MG/DL (ref 65–99)
GLUCOSE: NEGATIVE
HDL CHOLESTEROL: 56 MG/DL
HEMATOCRIT: 41.3 % (ref 35–45)
HEMOGLOBIN: 13.5 G/DL (ref 11.7–15.5)
KETONES: NEGATIVE
LDL-CHOLESTEROL: 106 MG/DL (CALC)
LYMPHOCYTES: 21.2 %
MCH: 27.9 PG (ref 27–33)
MCHC: 32.7 G/DL (ref 32–36)
MCV: 85.3 FL (ref 80–100)
MONOCYTES: 5.3 %
MPV: 10.8 FL (ref 7.5–12.5)
NEUTROPHILS: 71.5 %
NITRITE: NEGATIVE
NON-HDL CHOLESTEROL: 123 MG/DL (CALC)
OCCULT BLOOD: NEGATIVE
PH: 6 (ref 5–8)
PLATELET COUNT: 319 THOUSAND/UL (ref 140–400)
POTASSIUM: 4.5 MMOL/L (ref 3.5–5.3)
PROTEIN, TOTAL: 6.7 G/DL (ref 6.1–8.1)
PROTEIN: NEGATIVE
RDW: 13.5 % (ref 11–15)
RED BLOOD CELL COUNT: 4.84 MILLION/UL (ref 3.8–5.1)
SODIUM: 138 MMOL/L (ref 135–146)
SPECIFIC GRAVITY: 1.02 (ref 1–1.03)
TRIGLYCERIDES: 82 MG/DL
TSH W/REFLEX TO FT4: 1.53 MIU/L
WHITE BLOOD CELL COUNT: 11.4 THOUSAND/UL (ref 3.8–10.8)

## 2023-03-20 NOTE — TELEPHONE ENCOUNTER
Spoke to patient, she has frequency of urination discomfort on urination, urine culture shows gram-negative bacteria final culture is pending should be available tomorrow, will decide until tomorrow to see what antibiotic she needs, normal CBC CMP and lipids. Patient will recheck labs in 3 months for diabetes, continue same thyroid medication.

## 2023-03-20 NOTE — TELEPHONE ENCOUNTER
Patient returned our call. Patient's date of birth and full name both confirmed.    Transferred to Dr. Asif Mancilla

## 2023-03-21 NOTE — TELEPHONE ENCOUNTER
Spoke with patient (name and  verified). Reviewed results and recommendations per Dr. Marian Duncan. Patient voiced understanding and agrees with plan. Patient verified that [address] is correct.

## 2023-03-21 NOTE — TELEPHONE ENCOUNTER
Patient called back to check on urine culture. At time she called, sensitivities were not back, but at present (2:07 pm) results are back. Klebsiella pneumoniae, greater than 100,000 CFU. Dr Regine Solo, please advise. Sensitive to cipro, sulfa. Intermediate to nitrofurantoin.

## 2023-03-21 NOTE — TELEPHONE ENCOUNTER
Please let patient know that I sent prescription for Bactrim generic to [facility], according to urine culture results.   Please send to different pharmacy if needed please

## 2023-04-04 ENCOUNTER — TELEPHONE (OUTPATIENT)
Dept: INTERNAL MEDICINE CLINIC | Facility: CLINIC | Age: 43
End: 2023-04-04

## 2023-04-14 ENCOUNTER — HOSPITAL ENCOUNTER (OUTPATIENT)
Dept: MAMMOGRAPHY | Age: 43
Discharge: HOME | End: 2023-04-14
Attending: INTERNAL MEDICINE
Payer: MEDICAID

## 2023-04-14 DIAGNOSIS — I10 ESSENTIAL HYPERTENSION: ICD-10-CM

## 2023-04-14 DIAGNOSIS — Z12.31 SCREENING MAMMOGRAM FOR BREAST CANCER: ICD-10-CM

## 2023-04-14 PROCEDURE — 77063 BREAST TOMOSYNTHESIS BI: CPT | Performed by: INTERNAL MEDICINE

## 2023-04-14 PROCEDURE — 77067 SCR MAMMO BI INCL CAD: CPT | Performed by: INTERNAL MEDICINE

## 2023-05-05 RX ORDER — LISINOPRIL 5 MG/1
5 TABLET ORAL DAILY
Qty: 90 TABLET | Refills: 3 | Status: SHIPPED | OUTPATIENT
Start: 2023-05-05

## 2023-05-05 RX ORDER — LEVOTHYROXINE SODIUM 0.05 MG/1
50 TABLET ORAL DAILY
Qty: 90 TABLET | Refills: 3 | Status: SHIPPED | OUTPATIENT
Start: 2023-05-05

## 2023-05-05 NOTE — TELEPHONE ENCOUNTER
Patient called office. Patient's date of birth and full name both confirmed. Requesting refill and has new preferred pharmacy. Requesting to delete 2 previous pharmacies. Chart updated. 2 Rx's pended. Advised it will be reviewed and processed by our team within 48-72 business hours. She verbalizes understanding of all information, and agreeable to plan.

## 2023-05-05 NOTE — TELEPHONE ENCOUNTER
Refill passed per [facility] protocol. Requested Prescriptions   Pending Prescriptions Disp Refills    levothyroxine 50 MCG Oral Tab 90 tablet 1     Sig: Take 1 tablet (50 mcg total) by mouth daily. Thyroid Medication Protocol Passed - 5/5/2023 10:42 AM        Passed - TSH in past 12 months        Passed - Last TSH value is normal     Lab Results   Component Value Date    TSH 1.640 10/21/2022    THYROIDFUNC 7.74 (H) 12/22/2015    TSHT4 1.53 03/18/2023                 Passed - In person appointment or virtual visit in the past 12 mos or appointment in next 3 mos     Recent Outpatient Visits              2 months ago Physical exam, annual    [address], [facility]    Office Visit    6 months ago Acute tracheitis with obstruction    [address] [facility] [address], [place] Suzzanne Hush, MD    Office Visit    1 year ago [facility], [address], [place] [facility]    Telemedicine    1 year ago Essential hypertension    [address], [address], Fernando Johnson, APRN    Telemedicine    1 year ago [address], [place], [facility]    Telemedicine                        lisinopril 5 MG Oral Tab 90 tablet 1     Sig: Take 1 tablet (5 mg total) by mouth daily.        Hypertensive Medications Protocol Passed - 5/5/2023 10:42 AM        Passed - In person appointment in the past 12 or next 3 months     Recent Outpatient Visits              2 months ago Physical exam, annual    [address], [facility] Anabela Lara MD    Office Visit    6 months ago Acute tracheitis with obstruction    [address], [facility], [place] Suzzanne Hush, MD    Office Visit    1 year ago [facility], [address], [place] Loan Stafford PA-C    Telemedicine    1 year ago Essential hypertension    [facility], [address], Fernando Lainez, APRN    Telemedicine    1 year ago [address], [address], [place], APRFANNIE    Telemedicine                      Passed - Last BP reading less than 140/90     BP Readings from Last 1 Encounters:  03/01/23 : 131/84                Passed - CMP or BMP in past 6 months     Recent Results (from the past 4392 hour(s))   COMP METABOLIC PANEL (14)    Collection Time: 03/18/23  8:45 AM   Result Value Ref Range    GLUCOSE 105 (H) 65 - 99 mg/dL     Comment:               Fasting reference interval     For someone without known diabetes, a glucose value  between 100 and 125 mg/dL is consistent with  prediabetes and should be confirmed with a  follow-up test.         UREA NITROGEN (BUN) 18 7 - 25 mg/dL    CREATININE 0.63 0.50 - 0.99 mg/dL    EGFR 112 > OR = 60 mL/min/1.73m2     Comment: The eGFR is based on the CKD-EPI 2021 equation. To calculate   the new eGFR from a previous Creatinine or Cystatin C  result, go to [email or web address]. org/professionals/  [email or web address]      BUN/CREATININE RATIO NOT APPLICABLE 6 - 22 (calc)    SODIUM 138 135 - 146 mmol/L    POTASSIUM 4.5 3.5 - 5.3 mmol/L    CHLORIDE 104 98 - 110 mmol/L    CARBON DIOXIDE 25 20 - 32 mmol/L    CALCIUM 9.0 8.6 - 10.2 mg/dL    PROTEIN, TOTAL 6.7 6.1 - 8.1 g/dL    ALBUMIN 3.9 3.6 - 5.1 g/dL    GLOBULIN 2.8 1.9 - 3.7 g/dL (calc)    ALBUMIN/GLOBULIN RATIO 1.4 1.0 - 2.5 (calc)    BILIRUBIN, TOTAL 0.5 0.2 - 1.2 mg/dL    ALKALINE PHOSPHATASE 93 31 - 125 U/L    AST 14 10 - 30 U/L    ALT 15 6 - 29 U/L     *Note: Due to a large number of results and/or encounters for the requested time period, some results have not been displayed. A complete set of results can be found in Results Review.                  Passed - In person appointment or virtual visit in the past 6 months     Recent Outpatient Visits              2 months ago Physical exam, annual    [address], [facility] Anabela Waldrop MD    Office Visit    6 months ago Acute tracheitis with obstruction    [address], [facility], [place] Anabela Waldrop MD    Office Visit    1 year ago [facility], [facility], [place] Ezzie Specking, [employer]    Telemedicine    1 year ago Essential hypertension    [address], [address], Fernando Rhoades Person, APRN    Telemedicine    1 year ago [address], [address] [facility] [place], [facility]    Telemedicine                      Passed - [facility] or GFRNAA > 50     GFR Evaluation  EGFRCR: 110 , resulted on 1/21/2023                 Recent Outpatient Visits              2 months ago Physical exam, annual    [address], [facility], Anabela Castorena MD    Office Visit    6 months ago Acute tracheitis with obstruction    [address], [facility], [place] Aubrey Duenas MD    Office Visit    1 year ago [facility], [address] [place] [address], [place] Ezzie Specking, [employer]    Telemedicine    1 year ago Essential hypertension    [address], [address], Fernando Rhoades Person, APRN    Telemedicine    1 year ago [address], [address] [facility], [place], [facility]    Telemedicine

## 2023-06-02 ENCOUNTER — HOSPITAL ENCOUNTER (OUTPATIENT)
Age: 43
Discharge: HOME | End: 2023-06-02
Attending: EMERGENCY MEDICINE
Payer: MEDICAID

## 2023-06-02 VITALS
OXYGEN SATURATION: 97 % | DIASTOLIC BLOOD PRESSURE: 83 MMHG | SYSTOLIC BLOOD PRESSURE: 134 MMHG | TEMPERATURE: 98 F | HEART RATE: 93 BPM | RESPIRATION RATE: 18 BRPM

## 2023-06-02 DIAGNOSIS — J39.8 CONGESTION OF UPPER RESPIRATORY TRACT: Primary | ICD-10-CM

## 2023-06-02 LAB
S PYO AG THROAT QL IA.RAPID: NEGATIVE
SARS-COV-2 RNA RESP QL NAA+PROBE: NOT DETECTED

## 2023-06-02 PROCEDURE — 87651 STREP A DNA AMP PROBE: CPT | Performed by: EMERGENCY MEDICINE

## 2023-06-02 PROCEDURE — 99213 OFFICE O/P EST LOW 20 MIN: CPT

## 2023-06-02 RX ORDER — BENZONATATE 200 MG/1
200 CAPSULE ORAL 3 TIMES DAILY PRN
Qty: 21 CAPSULE | Refills: 0 | Status: SHIPPED | OUTPATIENT
Start: 2023-06-02 | End: 2023-06-09

## 2023-06-02 RX ORDER — FLUTICASONE PROPIONATE 50 MCG
1 SPRAY, SUSPENSION (ML) NASAL 2 TIMES DAILY
Qty: 16 G | Refills: 0 | Status: SHIPPED | OUTPATIENT
Start: 2023-06-02 | End: 2023-06-09

## 2023-06-06 ENCOUNTER — OFFICE VISIT (OUTPATIENT)
Dept: INTERNAL MEDICINE CLINIC | Facility: CLINIC | Age: 43
End: 2023-06-06

## 2023-06-06 VITALS
RESPIRATION RATE: 16 BRPM | BODY MASS INDEX: 41.91 KG/M2 | HEIGHT: 66 IN | DIASTOLIC BLOOD PRESSURE: 84 MMHG | HEART RATE: 84 BPM | WEIGHT: 260.81 LBS | SYSTOLIC BLOOD PRESSURE: 125 MMHG

## 2023-06-06 DIAGNOSIS — R73.03 PREDIABETES: ICD-10-CM

## 2023-06-06 DIAGNOSIS — I10 PRIMARY HYPERTENSION: ICD-10-CM

## 2023-06-06 DIAGNOSIS — E66.01 CLASS 3 SEVERE OBESITY DUE TO EXCESS CALORIES WITHOUT SERIOUS COMORBIDITY WITH BODY MASS INDEX (BMI) OF 40.0 TO 44.9 IN ADULT (HCC): ICD-10-CM

## 2023-06-06 DIAGNOSIS — J06.9 UPPER RESPIRATORY TRACT INFECTION, UNSPECIFIED TYPE: Primary | ICD-10-CM

## 2023-06-06 PROCEDURE — 3079F DIAST BP 80-89 MM HG: CPT | Performed by: INTERNAL MEDICINE

## 2023-06-06 PROCEDURE — 3008F BODY MASS INDEX DOCD: CPT | Performed by: INTERNAL MEDICINE

## 2023-06-06 PROCEDURE — 99214 OFFICE O/P EST MOD 30 MIN: CPT | Performed by: INTERNAL MEDICINE

## 2023-06-06 PROCEDURE — 3074F SYST BP LT 130 MM HG: CPT | Performed by: INTERNAL MEDICINE

## 2023-06-06 RX ORDER — ALBUTEROL SULFATE 90 UG/1
2 AEROSOL, METERED RESPIRATORY (INHALATION) EVERY 4 HOURS PRN
Qty: 1 EACH | Refills: 1 | Status: SHIPPED | OUTPATIENT
Start: 2023-06-06

## 2023-06-16 ENCOUNTER — OFFICE VISIT (OUTPATIENT)
Dept: OBGYN CLINIC | Facility: CLINIC | Age: 43
End: 2023-06-16

## 2023-06-16 VITALS
HEART RATE: 93 BPM | DIASTOLIC BLOOD PRESSURE: 83 MMHG | SYSTOLIC BLOOD PRESSURE: 119 MMHG | BODY MASS INDEX: 41 KG/M2 | WEIGHT: 253 LBS

## 2023-06-16 DIAGNOSIS — N76.4 VULVAR ABSCESS: Primary | ICD-10-CM

## 2023-06-16 PROCEDURE — 3079F DIAST BP 80-89 MM HG: CPT | Performed by: NURSE PRACTITIONER

## 2023-06-16 PROCEDURE — 99213 OFFICE O/P EST LOW 20 MIN: CPT | Performed by: NURSE PRACTITIONER

## 2023-06-16 PROCEDURE — 3074F SYST BP LT 130 MM HG: CPT | Performed by: NURSE PRACTITIONER

## 2023-06-16 RX ORDER — CEPHALEXIN 500 MG/1
500 CAPSULE ORAL 2 TIMES DAILY
Qty: 14 CAPSULE | Refills: 0 | Status: SHIPPED | OUTPATIENT
Start: 2023-06-16

## 2023-06-19 LAB
HSV1 DNA SPEC QL NAA+PROBE: NEGATIVE
HSV2 DNA SPEC QL NAA+PROBE: NEGATIVE

## 2023-07-11 ENCOUNTER — TELEPHONE (OUTPATIENT)
Dept: INTERNAL MEDICINE CLINIC | Facility: CLINIC | Age: 43
End: 2023-07-11

## 2023-07-11 NOTE — TELEPHONE ENCOUNTER
Patient is requesting an order for HIV and STD testing. She denies symptoms but was recently sexually active with a heroin addict. Please advise.

## 2023-07-11 NOTE — TELEPHONE ENCOUNTER
Please let patient know that I placed order for blood test and urine test to check for chlamydia and gonorrhea from the urine.   See below request

## 2023-09-18 ENCOUNTER — TELEPHONE (OUTPATIENT)
Dept: OBGYN CLINIC | Facility: CLINIC | Age: 43
End: 2023-09-18

## 2023-09-22 ENCOUNTER — OFFICE VISIT (OUTPATIENT)
Dept: OBGYN CLINIC | Facility: CLINIC | Age: 43
End: 2023-09-22

## 2023-09-22 VITALS
DIASTOLIC BLOOD PRESSURE: 82 MMHG | BODY MASS INDEX: 43 KG/M2 | WEIGHT: 267 LBS | SYSTOLIC BLOOD PRESSURE: 133 MMHG | HEART RATE: 89 BPM

## 2023-09-22 DIAGNOSIS — N76.4 VULVAR ABSCESS: Primary | ICD-10-CM

## 2023-09-22 PROCEDURE — 3075F SYST BP GE 130 - 139MM HG: CPT | Performed by: NURSE PRACTITIONER

## 2023-09-22 PROCEDURE — 56405 I&D VULVA/PERINEAL ABSCESS: CPT | Performed by: NURSE PRACTITIONER

## 2023-09-22 PROCEDURE — 99213 OFFICE O/P EST LOW 20 MIN: CPT | Performed by: NURSE PRACTITIONER

## 2023-09-22 PROCEDURE — 3079F DIAST BP 80-89 MM HG: CPT | Performed by: NURSE PRACTITIONER

## 2023-09-22 RX ORDER — CEPHALEXIN 500 MG/1
500 CAPSULE ORAL 2 TIMES DAILY
Qty: 14 CAPSULE | Refills: 0 | Status: SHIPPED | OUTPATIENT
Start: 2023-09-22

## 2023-09-22 NOTE — PROCEDURES
I/D Vulvar Abscess    Birth control method(s) used:tubal ligation    Consent signed. Procedure discussed with patient in detail including indication, risk, benefits, alternatives and complications. Lesion description:  see note    Procedure:  Betadine wash of procedural site. 1% lidocaine without epinephrine used topically for local anesthesia. Stab incision made with expression of 10 cc pus. Good hemostasis noted. Patient tolerated procedure well. Plan:  Sitz bath encouraged. Site care discussed with patient. Pelvic rest until healed  Discharge instructions reviewed with the patient.     Follow-up if not improved    FARIDEH Ha

## 2023-10-06 ENCOUNTER — PATIENT MESSAGE (OUTPATIENT)
Dept: INTERNAL MEDICINE CLINIC | Facility: CLINIC | Age: 43
End: 2023-10-06

## 2023-10-16 ENCOUNTER — IMMUNIZATION (OUTPATIENT)
Dept: LAB | Age: 43
End: 2023-10-16
Attending: EMERGENCY MEDICINE
Payer: MEDICAID

## 2023-10-16 DIAGNOSIS — Z23 NEED FOR VACCINATION: Primary | ICD-10-CM

## 2023-10-16 PROCEDURE — 90480 ADMN SARSCOV2 VAC 1/ONLY CMP: CPT

## 2024-01-27 LAB
ALBUMIN/GLOBULIN RATIO: 1.4 (CALC) (ref 1–2.5)
ALBUMIN: 4.1 G/DL (ref 3.6–5.1)
ALKALINE PHOSPHATASE: 88 U/L (ref 31–125)
ALT: 19 U/L (ref 6–29)
AST: 18 U/L (ref 10–35)
BILIRUBIN, TOTAL: 0.6 MG/DL (ref 0.2–1.2)
BUN: 10 MG/DL (ref 7–25)
CALCIUM: 9.4 MG/DL (ref 8.6–10.2)
CARBON DIOXIDE: 22 MMOL/L (ref 20–32)
CHLORIDE: 104 MMOL/L (ref 98–110)
CREATININE: 0.64 MG/DL (ref 0.5–0.99)
EGFR: 111 ML/MIN/1.73M2
GLOBULIN: 2.9 G/DL (CALC) (ref 1.9–3.7)
GLUCOSE: 107 MG/DL (ref 65–99)
HEMOGLOBIN A1C: 6.2 % OF TOTAL HGB
POTASSIUM: 4.7 MMOL/L (ref 3.5–5.3)
PROTEIN, TOTAL: 7 G/DL (ref 6.1–8.1)
SODIUM: 138 MMOL/L (ref 135–146)

## 2024-01-29 ENCOUNTER — TELEPHONE (OUTPATIENT)
Dept: INTERNAL MEDICINE CLINIC | Facility: CLINIC | Age: 44
End: 2024-01-29

## 2024-01-29 NOTE — TELEPHONE ENCOUNTER
Left message for the patient that blood test looks stable, diabetic test in nondiabetic range hemoglobin A1c 6.2, normal kidney liver function test

## 2024-01-29 NOTE — TELEPHONE ENCOUNTER
Patient contacted our office and she made aware of Dr. Stanley's interpretation and recommendations. Patient verbalized understanding. No further questions or concerns at this time.

## 2024-01-29 NOTE — TELEPHONE ENCOUNTER
Patient called and is requesting if a nurse can give her a call to go over test results she got done 1/26 at [facility].

## 2024-02-29 ENCOUNTER — OFFICE VISIT (OUTPATIENT)
Dept: SURGERY | Facility: CLINIC | Age: 44
End: 2024-02-29
Payer: MEDICAID

## 2024-02-29 VITALS
OXYGEN SATURATION: 97 % | BODY MASS INDEX: 45.03 KG/M2 | DIASTOLIC BLOOD PRESSURE: 80 MMHG | SYSTOLIC BLOOD PRESSURE: 122 MMHG | HEART RATE: 84 BPM | WEIGHT: 267 LBS | HEIGHT: 64.4 IN

## 2024-02-29 DIAGNOSIS — E78.5 DYSLIPIDEMIA: ICD-10-CM

## 2024-02-29 DIAGNOSIS — F41.9 ANXIETY AND DEPRESSION: ICD-10-CM

## 2024-02-29 DIAGNOSIS — E66.01 MORBID OBESITY WITH BMI OF 45.0-49.9, ADULT (HCC): ICD-10-CM

## 2024-02-29 DIAGNOSIS — E03.8 OTHER SPECIFIED HYPOTHYROIDISM: ICD-10-CM

## 2024-02-29 DIAGNOSIS — F32.A ANXIETY AND DEPRESSION: ICD-10-CM

## 2024-02-29 DIAGNOSIS — I10 HYPERTENSION, UNSPECIFIED TYPE: Primary | ICD-10-CM

## 2024-02-29 RX ORDER — TOPIRAMATE 25 MG/1
25 TABLET ORAL DAILY
Qty: 60 TABLET | Refills: 3 | Status: SHIPPED | OUTPATIENT
Start: 2024-02-29

## 2024-02-29 RX ORDER — LISDEXAMFETAMINE DIMESYLATE CAPSULES 30 MG/1
30 CAPSULE ORAL EVERY MORNING
COMMUNITY

## 2024-02-29 NOTE — PATIENT INSTRUCTIONS
JJ Virgin   You Tube strength videos     Start 25 mg topiramate in the evening for cravings and to assist weight loss.   Increase to BID as tolerated.     Daily Calories:  120-174 lbs: 1200 calories/day.  175-219 lbs: 1500 calories/day.  220-249 lbs: 1800 calories/day.   250 lbs or more: 2,000 calories/day.    Aim for  grams protein/day.    30 grams/meal.     3 PM nuts/seeds.     Eat within 1-3 hours upon waking.     Meals between 7 or 9 AM and 7 PM.     6 days on, 1 day off.     Cronometer, Lose It for tracking.    Add psyllium husk daily. Greta Organics.     Build up to 35-40 grams of fiber/day.     Aim for 64 oz of water/day.    Aim for a total of:  2 fruits a day (avocado, tomato, citrus/oranges, apples, berries)  1/2 cup or medium size    4 non-starchy vegetables/day (1/2 cup cooked; 1 cup raw) (greens, peppers, onions, garlic, broccoli, cauliflower, brussels sprouts, asparagus, etc.)    0-2 starches/day (oatmeal, sweet potato, carrots, brown rice, etc).    3 protein per day (fish, seafood, meat, or plants: salmon, nuts, seeds, shrimp, chicken, turkey, beans, lentils, chickpeas, etc).    2 healthy fats (avocado, avocado oil, olives, olive oil, salmon, nuts, seeds)    I recommend a whole food, plant powered diet with low glycemic index:     Aim for 3 meals a day and 1-2 snacks as needed.    Aim for a protein + produce at each meal time.     Breakfast ideas:  1. Fruit and nuts/seeds.  2. Eggs scrambled with vegetables.  3. Oatmeal (stovetop), cinnamon, 2 tbsp flaxseed, berries, nuts.  4. Protein shake + fruit. (1 scoop with water/ice). Garden of Life Fit, Nutiva, Gonzalez, and Orgain are good brands.      Snacks:  Raw vegetables and hummus, apples and peanut butter, nuts, seeds, fruit, pecans drizzled with organic honey.      Use the Healthy Plate method for lunch and dinner:  1/2 right side of plate non-starchy vegetables.  Bottom left 1/4 plate protein.  Top left 1/4 starch as desired.      Aim for 150  minutes moderate level exercise weekly with 2-3 days strength training.    Add Magnesium glycinate 200 to 500 mg/day for sleep.   Life Extension. NOW. Garden of Life. Whole Food Brand. MaryRuth's. Pure Encapsulations.     Or consider Natural Calm.   by Natural Vitality  Follow dosage instructions.  Start 1 teaspoon and increase up to 3 teaspoons as needed for sleep.

## 2024-02-29 NOTE — PROGRESS NOTES
The Wellness and Weight Loss Consultation Note       Date of Consult:  2024    Patient:  Sylwia Mckeon  :      10/11/1980  MRN:      [id number]    Referring Provider: Dr. Stanley    Chief Complaint:    Chief Complaint   Patient presents with    Consult    Weight Management    Obesity       SUBJECTIVE     History of Present Illness:  Sylwia Mckeon has been referred to me for evaluation and treatment.       44 yo female.  Presents to clinic for assistance with weight loss/maintenance.   Reports hx depression/anxiety/ADHD. Has experienced weight gain with medication treatment.     Recently lost 22 lbs after starting Vyvanse.     Patient is considering medications and would prefer not to have bariatric surgery for weight loss.    Patient denies any history of eating disorder(s).    Patient is employed: [employer].  Patient lives with daughter.    Patient's goal weight: Healthy, under 200 lbs   Biggest weight loss in the past: 22 lbs  How weight loss was achieved: Vyvanse   Heaviest weight ever: 290 lbs recently   Previous use of medical weight loss medications:    Physical activity: not consistent  Plans to join the health club     Sleep: adequate hours/night    Sleep screening:       Past Medical History:   Past Medical History:   Diagnosis Date    Anxiety     Bronchitis     Decorative tattoo     Depression     Essential hypertension     Headache     headaches    Hypertension complicating childbirth (HCC) 10/15/2015    Hypothyroidism      (normal spontaneous vaginal delivery) ([facility])     female    Pre-diabetes     Pregnancy ([facility])     EAB  / unknown sex       OBJECTIVE     Vitals: /80 (BP Location: Right arm, Patient Position: Sitting, Cuff Size: adult)   Pulse 84   Ht 5' 4.4\" (1.636 m)   Wt 267 lb (121.1 kg)   LMP 2023 (Approximate)   SpO2 97%   BMI 45.26 kg/m²        Wt Readings from Last 6 Encounters:   24 267 lb (121.1 kg)   23 267 lb  (121.1 kg)   23 253 lb (114.8 kg)   23 260 lb 12.8 oz (118.3 kg)   23 263 lb (119.3 kg)   10/14/22 275 lb (124.7 kg)        Patient Medications:    Current Outpatient Medications   Medication Sig Dispense Refill    lisdexamfetamine 30 MG Oral Cap Take 1 capsule (30 mg total) by mouth every morning.      topiramate 25 MG Oral Tab Take 1 tablet (25 mg total) by mouth daily. 60 tablet 3    albuterol (VENTOLIN HFA) 108 (90 Base) MCG/ACT Inhalation Aero Soln Inhale 2 puffs into the lungs every 4 (four) hours as needed for Wheezing. 1 each 1    levothyroxine 50 MCG Oral Tab Take 1 tablet (50 mcg total) by mouth daily. 90 tablet 3    lisinopril 5 MG Oral Tab Take 1 tablet (5 mg total) by mouth daily. 90 tablet 3    ergocalciferol 1.25 MG (51722 UT) Oral Cap Take 1 capsule (50,000 Units total) by mouth once a week. 12 capsule 1    Blood Glucose Monitoring Suppl (ONETOUCH ULTRA 2) w/Device Does not apply Kit Test blood sugar 2 times daily 1 kit 0    Glucose Blood (ONETOUCH ULTRA) In Vitro Strip Test blood sugar 2 times daily 200 each 3    OneTouch Delica Lancets 30G Does not apply Misc 1 lancet by Does not apply route 2 (two) times a day. 200 each 3       Allergies:  Patient has no known allergies.     Comorbidities:  HTN     Social History: Reviewed     Surgical History:    Past Surgical History:   Procedure Laterality Date          x1    TUBAL LIGATION         Family History:    Family History   Problem Relation Age of Onset    Cancer Other         liver cancer    Diabetes Father     Lipids Father     Hypertension Father     Diabetes Mother            Typical Dietary Intake:  Breakfast AM Snack Lunch PM Snack Dinner   May skip now on Vyvanse    Previously drive thru  Quick and easy  Occasional bag of chips  Yogurt    May skip None Varies         After dinner behavior: -  Night eating: + occasional yogurt, string cheese   Portion sizes: +  Binge: +  Emotional: +  Depression: +  Grazing: +  Sweet  tooth: + prior to Vyvanse   Crunchy/salty: + prior to Vyvanse   Etoh: None   Soda Drinker: Yes  If yes, how much?:  6 oz soda per day  Sports Drinks:  No    Juice:  Yes  If yes, how much?:  apple juice    Number of restaurant or fast food meals/week:  occasional meals/week    Nutritional Goals Reviewed and Discussed:     Eat 3-4 cups of fresh fruit or vegetables daily    Behavior Modifications Reviewed and Discussed:    Eat breakfast, Eat 3 meals per day, Plan meals in advance, Read nutrition labels, Drink 64oz of water per day, Maintain a daily food journal, Utilize portion control strategies to reduce calorie intake, Identify triggers for eating and manage cues, and Eat slowly and take 20 to 30 minutes to complete each meal      ROS:  Constitutional: positive for fatigue  Respiratory: negative  Cardiovascular: negative  Gastrointestinal: negative  Integument/breast: negative  Hematologic/lymphatic: negative  Musculoskeletal:negative  Neurological: positive for headaches  Behavioral/Psych: positive for anxiety and depression  Endocrine: negative    Physical Exam:  General appearance: alert, appears stated age, cooperative and obese  Head: Normocephalic, without obvious abnormality, atraumatic  Neck: no adenopathy, no carotid bruit, no JVD, supple, symmetrical, trachea midline and thyroid not enlarged, symmetric, no tenderness/mass/nodules  Lungs: clear to auscultation bilaterally  Heart: S1, S2 normal, no murmur, click, rub or gallop, regular rate and rhythm  Abdomen: soft, non-tender; bowel sounds normal; no masses,  no organomegaly and abdomen obese   Extremities: intact, no edema   Pulses: 2+ and symmetric  Skin: intact   Neurologic: Grossly normal      ASSESSMENT       Encounter Diagnosis(ses):   1. Hypertension, unspecified type    2. Other specified hypothyroidism    3. Morbid obesity with BMI of 45.0-49.9, adult (HCC)    4. Dyslipidemia    5. Anxiety and depression        PLAN       Diagnoses and all orders  for this visit:    Hypertension, unspecified type  -     Jump Start Your Health; Future    Other specified hypothyroidism  -     Jump Start Your Health; Future    Morbid obesity with BMI of 45.0-49.9, adult (HCC)  -     topiramate 25 MG Oral Tab; Take 1 tablet (25 mg total) by mouth daily.  -     Jump Start Your Health; Future    Dyslipidemia  -     Jump Start Your Health; Future    Anxiety and depression        HYPERTENSION: Blood pressure stable on the above medications. No interval change in antihypertensive medication.     DYSLIPIDEMIA: Recommend dietary changes and lifestyle modifications as discussed below. Monitor.       Lab Results   Component Value Date/Time    CHOLEST 179 03/18/2023 08:45 AM     (H) 03/18/2023 08:45 AM    HDL 56 03/18/2023 08:45 AM    TRIG 82 03/18/2023 08:45 AM    VLDL 18 10/21/2022 09:07 AM    TCHDLRATIO 3.2 03/18/2023 08:45 AM       OBESITY/WEIGHT GAIN:    Recommended intensive lifestyle and behavioral modifications at this time for weight loss.    Educated patient on lifestyle modifications: Whole Food/Plant Strong/Low Glycemic Index diet, moderate alcohol consumption, reduced sodium intake to no more than 2,400 mg/day, and at least 150 minutes of moderate physical activity per week.   Avoid processed, poor quality carbohydrates, refined grains, flour, sugar.    Goals for next month:  1. Keep a food log.  2. Drink 64 ounces of non-caloric beverages per day. No fruit juices or regular soda.  3. Aim for 150 minutes moderate exercise per week.    4. Increase fruit and vegetable servings to 5-6 per day.    5. Improve sleep and stress.     Reviewed labs:  1/26/24- A1c 6.2.   , CMP otherwise in range.     Discussed medication options for weight loss in detail with patient.     Continue Vyvanse 30 mg in the AM.   Increase dose as needed/tolerated.     Night Eating Syndrome:  Denies history of renal stones.   Hx headaches.  Start 25 mg topiramate in the evening for cravings and  to assist weight loss.   Increase to BID as tolerated.      Discussed risks, benefits, rationale, and side effects of medication(s). Patient states understanding. All questions answered.   Advised against pregnancy while on medication, patient's form of birth control s/p tubal ligation.     Consider WW. Consider Jump Start.   Healthy Plate Method.  250 lbs or more: 2,000 calories/day.  Aim for  grams protein/day.    Patient is not interested in bariatric surgery at this time. Will review as needed.     I spent 45 minutes preparing chart, obtaining/reviewing pertinent history, performing medically appropriate examination/evaluations, counseling/educating on assessment and plan, ordering and reviewing tests/medications as needed, referring/communicating with other health care professionals as needed, documenting clinical information, interpreting results, communicating results, and/or coordinating patient care.     RTC 4-6 weeks virtual.     FARIDEH Justin

## 2024-04-27 RX ORDER — LEVOTHYROXINE SODIUM 0.05 MG/1
50 TABLET ORAL DAILY
Qty: 90 TABLET | Refills: 3 | Status: SHIPPED | OUTPATIENT
Start: 2024-04-27

## 2024-04-27 RX ORDER — LISINOPRIL 5 MG/1
5 TABLET ORAL DAILY
Qty: 90 TABLET | Refills: 3 | Status: SHIPPED | OUTPATIENT
Start: 2024-04-27

## 2024-04-27 NOTE — TELEPHONE ENCOUNTER
Please call patient I refilled medication for 2 months, she is due for follow-up visit in June 1 year since last visit

## 2024-04-27 NOTE — TELEPHONE ENCOUNTER
Please review; protocol failed.  Pt is overdue for labs  Hypothyroid Medications  Protocol Criteria:  Appointment scheduled in the past 12 months or the next 3 months  TSH resulted in the past 12 months that is normal  Recent Outpatient Visits              1 month ago [facility], [facility]    [facility]    7 months ago Vulvar abscess    [facility]    Office Visit    10 months ago Vulvar abscess    [facility]    Office Visit    10 months ago Upper respiratory tract infection, unspecified type    [facility], [place] Kandel, Ninel, MD    Office Visit    1 year ago Physical exam, annual    [facility], [place] Kandel, Ninel, MD    Office Visit          Future Appointments         Provider Department Appt Notes    In 2 months Gala Reilly APRN [facility]           Lab Results   Component Value Date    TSH 1.640 10/21/2022    THYROIDFUNC 7.74 (H) 12/22/2015    TSHT4 1.53 03/18/2023           Refill passed per [facility] protocol.    Last BP was 122/80 on 2/29/24  Hypertensive Medications  Protocol Criteria:  Appointment scheduled in the past 6 months or in the next 3 months  BMP or CMP in the past 12 months  Creatinine result < 2  Recent Outpatient Visits              1 month ago Hypertension, unspecified type    [facility]    Office Visit    7 months ago Vulvar abscess    [facility]    Office Visit    10 months ago Vulvar abscess    [facility]    Office Visit    10 months ago  [facility] respiratory tract infection, unspecified type    [facility]    [facility] Visit    1 year ago Physical exam, annual    [facility], [place] Kandel, Ninel, MD    Office Visit          Future Appointments         Provider Department Appt Notes    In 2 months Gala Reilly APRN [facility]           Lab Results   Component Value Date     (H) 01/26/2024    BUN 10 01/26/2024    CREATSERUM 0.64 01/26/2024    BUNCREA SEE NOTE: 01/26/2024    GFRNAA 101 12/06/2021    GFRAA 117 12/06/2021    CA 9.4 01/26/2024    ALKPHOS 107 (H) 12/22/2015    AST 18 01/26/2024    ALT 19 01/26/2024    BILT 0.6 01/26/2024    TP 7.0 01/26/2024    ALB 4.1 01/26/2024     01/26/2024    K 4.7 01/26/2024     01/26/2024    CO2 22 01/26/2024    GLOBULIN 2.9 01/26/2024    AGRATIO 1.4 01/26/2024    ANIONGAP 6 01/21/2023    OSMOCALC 283 01/21/2023

## 2024-05-02 ENCOUNTER — NURSE TRIAGE (OUTPATIENT)
Dept: INTERNAL MEDICINE CLINIC | Facility: CLINIC | Age: 44
End: 2024-05-02

## 2024-05-02 ENCOUNTER — OFFICE VISIT (OUTPATIENT)
Dept: INTERNAL MEDICINE CLINIC | Facility: CLINIC | Age: 44
End: 2024-05-02

## 2024-05-02 VITALS
OXYGEN SATURATION: 98 % | BODY MASS INDEX: 44.69 KG/M2 | DIASTOLIC BLOOD PRESSURE: 73 MMHG | WEIGHT: 265 LBS | SYSTOLIC BLOOD PRESSURE: 145 MMHG | HEIGHT: 64.4 IN | HEART RATE: 82 BPM

## 2024-05-02 DIAGNOSIS — J06.9 VIRAL URI: Primary | ICD-10-CM

## 2024-05-02 PROCEDURE — 99213 OFFICE O/P EST LOW 20 MIN: CPT | Performed by: NURSE PRACTITIONER

## 2024-05-02 RX ORDER — AZELASTINE 1 MG/ML
2 SPRAY, METERED NASAL 2 TIMES DAILY
Qty: 30 ML | Refills: 0 | Status: SHIPPED | OUTPATIENT
Start: 2024-05-02

## 2024-05-02 RX ORDER — BENZONATATE 200 MG/1
200 CAPSULE ORAL 3 TIMES DAILY PRN
Qty: 30 CAPSULE | Refills: 0 | Status: SHIPPED | OUTPATIENT
Start: 2024-05-02

## 2024-05-02 RX ORDER — ECHINACEA PURPUREA EXTRACT 125 MG
2 TABLET ORAL AS NEEDED
Qty: 60 ML | Refills: 0 | Status: SHIPPED | OUTPATIENT
Start: 2024-05-02

## 2024-05-02 NOTE — PROGRESS NOTES
Sylwia Mckeon is a 43 year old female.  HPI:   Pt reports cold sx that started one day ago. Pt reports sneezing, productive cough, HA, nasal congestion, runny nose, ild sore throat.  Denies any hx of asthma. Denies any CP, SOB, dizziness, vision changes, palpitations. Was on a field trip with son and that is when sx started.   Current Outpatient Medications   Medication Sig Dispense Refill    sodium chloride 0.65 % Nasal Solution 2 sprays by Nasal route as needed. 60 mL 0    azelastine 0.1 % Nasal Solution 2 sprays by Nasal route 2 (two) times daily. 30 mL 0    benzonatate 200 MG Oral Cap Take 1 capsule (200 mg total) by mouth 3 (three) times daily as needed for cough. 30 capsule 0    LISINOPRIL 5 MG Oral Tab TAKE ONE TABLET BY MOUTH DAILY 90 tablet 3    levothyroxine 50 MCG Oral Tab Take 1 tablet (50 mcg total) by mouth daily. 90 tablet 3    lisdexamfetamine 30 MG Oral Cap Take 1 capsule (30 mg total) by mouth every morning.      topiramate 25 MG Oral Tab Take 1 tablet (25 mg total) by mouth daily. 60 tablet 3    ergocalciferol 1.25 MG (56607 UT) Oral Cap Take 1 capsule (50,000 Units total) by mouth once a week. 12 capsule 1    Blood Glucose Monitoring Suppl (ONETOUCH ULTRA 2) w/Device Does not apply Kit Test blood sugar 2 times daily 1 kit 0    Glucose Blood (ONETOUCH ULTRA) In Vitro Strip Test blood sugar 2 times daily 200 each 3    OneTouch Delica Lancets 30G Does not apply Misc 1 lancet by Does not apply route 2 (two) times a day. 200 each 3    albuterol (VENTOLIN HFA) 108 (90 Base) MCG/ACT Inhalation Aero Soln Inhale 2 puffs into the lungs every 4 (four) hours as needed for Wheezing. 1 each 1      Past Medical History:    Anxiety    Bronchitis    Decorative tattoo    Depression    Essential hypertension    Headache    headaches    Hypertension complicating childbirth ([facility])    Hypothyroidism     (normal spontaneous vaginal delivery) ([facility])    female    Pre-diabetes    Pregnancy ([facility])    EAB  /  unknown sex      Social History:  Social History     Socioeconomic History    Marital status: Single   Tobacco Use    Smoking status: Never    Smokeless tobacco: Never   Vaping Use    Vaping status: Never Used   Substance and Sexual Activity    Alcohol use: No     Alcohol/week: 0.0 standard drinks of alcohol     Types: 1 drink(s) per week     Comment: None.     Drug use: No    Sexual activity: Yes     Partners: Male     Birth control/protection: Tubal Ligation   Other Topics Concern    Caffeine Concern Yes     Comment: 8 oz soda        REVIEW OF SYSTEMS:   Review of Systems   All other systems reviewed and are negative.         EXAM:   /73 (BP Location: Radial, Patient Position: Sitting, Cuff Size: adult)   Pulse 82   Ht 5' 4.4\" (1.636 m)   Wt 265 lb (120.2 kg)   LMP 09/08/2023 (Approximate)   SpO2 98%   BMI 44.92 kg/m²     Physical Exam  Vitals reviewed.   Constitutional:       General: She is not in acute distress.     Appearance: Normal appearance. She is obese. She is not ill-appearing.   HENT:      Head: Normocephalic and atraumatic.      Right Ear: Tympanic membrane, ear canal and external ear normal.      Left Ear: Tympanic membrane, ear canal and external ear normal.      Nose: Congestion present. No rhinorrhea.      Mouth/Throat:      Pharynx: Oropharynx is clear. No oropharyngeal exudate or posterior oropharyngeal erythema.      Comments: PND  Eyes:      General: No scleral icterus.        Right eye: No discharge.         Left eye: No discharge.      Extraocular Movements: Extraocular movements intact.      Conjunctiva/sclera: Conjunctivae normal.      Pupils: Pupils are equal, round, and reactive to light.   Neck:      Thyroid: No thyroid mass or thyromegaly.   Cardiovascular:      Rate and Rhythm: Normal rate and regular rhythm.      Pulses: Normal pulses.      Heart sounds: Normal heart sounds. No murmur heard.  Pulmonary:      Effort: Pulmonary effort is normal. No respiratory distress.       Breath sounds: Normal breath sounds. No stridor. No wheezing, rhonchi or rales.   Chest:      Chest wall: No tenderness.   Musculoskeletal:      Cervical back: Normal range of motion and neck supple.      Right lower leg: No edema.      Left lower leg: No edema.   Lymphadenopathy:      Cervical: No cervical adenopathy.   Skin:     General: Skin is warm and dry.      Coloration: Skin is not jaundiced.   Neurological:      General: No focal deficit present.      Mental Status: She is alert and oriented to person, place, and time.      Motor: Motor function is intact.      Gait: Gait normal.   Psychiatric:         Mood and Affect: Mood normal.         Judgment: Judgment normal.            ASSESSMENT AND PLAN:   1. Viral URI  Reviewed with patient likely viral/allergic component.  May try OTC antihistamine.  Nasal saline 2 sprays in each nostril every 3-4 hours as needed.   Azelastine1-2 sprays in each nostril once daily.   Tylenol 500 mg every 6-8 hours as needed (max dose 3000 mg/day).   Hydrate, humidifier, rest, honey/elderberry   - sodium chloride 0.65 % Nasal Solution; 2 sprays by Nasal route as needed.  Dispense: 60 mL; Refill: 0  - azelastine 0.1 % Nasal Solution; 2 sprays by Nasal route 2 (two) times daily.  Dispense: 30 mL; Refill: 0  - benzonatate 200 MG Oral Cap; Take 1 capsule (200 mg total) by mouth 3 (three) times daily as needed for cough.  Dispense: 30 capsule; Refill: 0       The patient indicates understanding of these issues and agrees to the plan.  The patient is asked to return in PRN.     The above note was creating using Dragon speech recognition technology. Please excuse any typos.

## 2024-05-02 NOTE — TELEPHONE ENCOUNTER
Action Requested: Summary for Provider     []  Critical Lab, Recommendations Needed  [] Need Additional Advice  []   FYI    []   Need Orders  [] Need Medications Sent to Pharmacy  []  Other     SUMMARY: Patient called to ask Dr Stanley for Z-tim.  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   Needs refill of inhaler.   She has also been having sinus congestion.  Advised needs office visit.  She agreed to be scheduled:  Future Appointments   Date Time Provider Department Center   2024  4:00 PM [facility] [place]   2024  6:00 PM [facility] [place]   2024  6:00 PM Gala Reilly APRN [id number]     Reason for call: Acute Cough and sinus problems  Onset: yesterday    Spoke with patient,  verified.   No audible cough or wheeze noted.    Reason for Disposition   Continuous (nonstop) coughing interferes with work or school and no improvement using cough treatment per Care Advice    Protocols used: Cough-A-OH

## 2024-05-03 ENCOUNTER — TELEPHONE (OUTPATIENT)
Facility: CLINIC | Age: 44
End: 2024-05-03

## 2024-05-03 DIAGNOSIS — Z00.00 PHYSICAL EXAM, ROUTINE: Primary | ICD-10-CM

## 2024-05-03 DIAGNOSIS — R73.03 PREDIABETES: ICD-10-CM

## 2024-05-03 RX ORDER — PREDNISONE 10 MG/1
TABLET ORAL
Qty: 12 TABLET | Refills: 0 | Status: SHIPPED | OUTPATIENT
Start: 2024-05-03

## 2024-05-03 NOTE — TELEPHONE ENCOUNTER
Attempted to call patient to triage symptoms,/condition update.    Left message to call back.    Patient was seen in office by FARIDEH Espinosa yesterday.    Please see message below.

## 2024-05-03 NOTE — TELEPHONE ENCOUNTER
Patient calling, was seen by MARIS Villagran yesterday as it was Dr. Stanley's day off.     Patient states she is still coughing a lot and wanting to know if Dr. Stanley can prescribe some prednisone for her to help with the cough.

## 2024-05-03 NOTE — TELEPHONE ENCOUNTER
Spoke to patient, she continues to cough a lot, states that cough is slightly loosening up, sent prescription for 6 days of prednisone, also placed orders for fasting blood work she can do prior to visit in June for a physical exam.

## 2024-05-03 NOTE — TELEPHONE ENCOUNTER
Patient called, verified Name and . States she was seen by FARIDEH Bernal yesterday for viral URI. She was prescribed benzonatate for the cough and also nasal spray which she has been taking. States she remains to have dry cough and had to take Mucinex DM in addition with no relief. Requesting to get a prescription for prednisone.     Dr. Stanley (FARIDEH Bernal) please advise.

## 2024-05-13 ENCOUNTER — TELEPHONE (OUTPATIENT)
Dept: INTERNAL MEDICINE CLINIC | Facility: CLINIC | Age: 44
End: 2024-05-13

## 2024-05-13 NOTE — TELEPHONE ENCOUNTER
Left detailed message to please call and schedule a follow up appointment with FARIDEH Villagran per Dr. Stanley    CSS when patient calls back, please schedule patient as advised per Dr. Stanley-does not need to be triaged. Thank you

## 2024-05-13 NOTE — TELEPHONE ENCOUNTER
Patient calling back, given message from Dr Stanley below.  Declined appointment today.  Scheduled tomorrow per her request.  Future Appointments   Date Time Provider Department Center   5/14/2024 12:40 PM Dolores Espinosa APRN [id number]  [place]   6/12/2024  9:20 AM Linda Alicea APRN [facility] [place]   6/18/2024  6:00 PM Vicky Stanley MD ECL[address] [place]   7/1/2024  6:00 PM Gala Reilly APRN [address]

## 2024-05-13 NOTE — TELEPHONE ENCOUNTER
Dr. Stanley-   Patient started the prednisone on 5/4 and completed course on 5/10 for cough and states did not help with cough at all.  Cough remains the same as does the wheezing. Patient states is unsure if has had a past diagnosis of asthma, has had bronchitis multiple times. Is unsure if you would like to see her- did offer a possible re-evaluation appointment with FARIDEH Villagran, patient would like to know what you would like the next steps to be. Please advise. Thank you

## 2024-05-13 NOTE — TELEPHONE ENCOUNTER
Pt  needs to be  reevaluated  , I recommended visit  again , I cannot   add her to day she  can see  Francie Espinosa  again she has openings

## 2024-05-14 ENCOUNTER — OFFICE VISIT (OUTPATIENT)
Dept: INTERNAL MEDICINE CLINIC | Facility: CLINIC | Age: 44
End: 2024-05-14

## 2024-05-14 ENCOUNTER — HOSPITAL ENCOUNTER (OUTPATIENT)
Dept: GENERAL RADIOLOGY | Age: 44
Discharge: HOME | End: 2024-05-14
Attending: NURSE PRACTITIONER

## 2024-05-14 VITALS
BODY MASS INDEX: 44.35 KG/M2 | SYSTOLIC BLOOD PRESSURE: 128 MMHG | DIASTOLIC BLOOD PRESSURE: 84 MMHG | HEIGHT: 64.4 IN | HEART RATE: 86 BPM | OXYGEN SATURATION: 98 % | WEIGHT: 263 LBS

## 2024-05-14 DIAGNOSIS — R05.9 COUGH, UNSPECIFIED TYPE: Primary | ICD-10-CM

## 2024-05-14 DIAGNOSIS — R05.9 COUGH, UNSPECIFIED TYPE: ICD-10-CM

## 2024-05-14 PROCEDURE — 71046 X-RAY EXAM CHEST 2 VIEWS: CPT | Performed by: NURSE PRACTITIONER

## 2024-05-14 PROCEDURE — 99213 OFFICE O/P EST LOW 20 MIN: CPT | Performed by: NURSE PRACTITIONER

## 2024-05-14 RX ORDER — LORATADINE 10 MG/1
10 TABLET ORAL DAILY
Qty: 90 TABLET | Refills: 0 | Status: SHIPPED | OUTPATIENT
Start: 2024-05-14

## 2024-05-14 RX ORDER — AZITHROMYCIN 250 MG/1
TABLET, FILM COATED ORAL
Qty: 6 TABLET | Refills: 0 | Status: SHIPPED | OUTPATIENT
Start: 2024-05-14 | End: 2024-05-19

## 2024-05-14 RX ORDER — ALBUTEROL SULFATE 90 UG/1
2 AEROSOL, METERED RESPIRATORY (INHALATION) EVERY 4 HOURS PRN
Qty: 6.7 G | Refills: 0 | Status: SHIPPED | OUTPATIENT
Start: 2024-05-14

## 2024-05-14 RX ORDER — PREDNISONE 10 MG/1
10 TABLET ORAL 2 TIMES DAILY
Qty: 10 TABLET | Refills: 0 | Status: SHIPPED | OUTPATIENT
Start: 2024-05-14 | End: 2024-05-19

## 2024-05-14 NOTE — PROGRESS NOTES
Sylwia Mckeon is a 43 year old female.  HPI:   Following up for cough. Cough started 2 weeks ago, treated symptomatically with OTC meds which did not help. After 1 week was given prednisone but did not notice an improvement, no new or worsening sx, but continues to have dry and productive cough, sinus pressure and HA. Denies ay CP, SOB, dizziness, palpitations, appetite or weight changes, rash, NVDC, sore throat. Neighbors have cats, noticed sneezing after she moved into her appt and neighbors got cats. No watery eyes, itching.   Current Outpatient Medications   Medication Sig Dispense Refill    azithromycin (ZITHROMAX Z-CHIDI) 250 MG Oral Tab Take 2 tablets (500 mg total) by mouth daily for 1 day, THEN 1 tablet (250 mg total) daily for 4 days. 6 tablet 0    predniSONE 10 MG Oral Tab Take 1 tablet (10 mg total) by mouth 2 (two) times daily for 5 days. 10 tablet 0    albuterol 108 (90 Base) MCG/ACT Inhalation Aero Soln Inhale 2 puffs into the lungs every 4 (four) hours as needed. 6.7 g 0    predniSONE 10 MG Oral Tab Take 30 mg PO daily x 2 days, Take 20 mg PO daily x 2 days, Take 10 mg PO daily x 2 days 12 tablet 0    sodium chloride 0.65 % Nasal Solution 2 sprays by Nasal route as needed. 60 mL 0    azelastine 0.1 % Nasal Solution 2 sprays by Nasal route 2 (two) times daily. 30 mL 0    benzonatate 200 MG Oral Cap Take 1 capsule (200 mg total) by mouth 3 (three) times daily as needed for cough. 30 capsule 0    LISINOPRIL 5 MG Oral Tab TAKE ONE TABLET BY MOUTH DAILY 90 tablet 3    levothyroxine 50 MCG Oral Tab Take 1 tablet (50 mcg total) by mouth daily. 90 tablet 3    lisdexamfetamine 30 MG Oral Cap Take 1 capsule (30 mg total) by mouth every morning.      topiramate 25 MG Oral Tab Take 1 tablet (25 mg total) by mouth daily. 60 tablet 3    ergocalciferol 1.25 MG (45905 UT) Oral Cap Take 1 capsule (50,000 Units total) by mouth once a week. 12 capsule 1    Blood Glucose Monitoring Suppl (ONETOUCH ULTRA 2)  w/Device Does not apply Kit Test blood sugar 2 times daily 1 kit 0    Glucose Blood (ONETOUCH ULTRA) In Vitro Strip Test blood sugar 2 times daily 200 each 3    OneTouch Delica Lancets 30G Does not apply Misc 1 lancet by Does not apply route 2 (two) times a day. 200 each 3      Past Medical History:    Anxiety    Bronchitis    Decorative tattoo    Depression    Essential hypertension    Headache    headaches    Hypertension complicating childbirth ([facility])    Hypothyroidism     (normal spontaneous vaginal delivery) ([facility])    female    Pre-diabetes    Pregnancy ([facility])    EAB 2000 / unknown sex      Social History:  Social History     Socioeconomic History    Marital status: Single   Tobacco Use    Smoking status: Never    Smokeless tobacco: Never   Vaping Use    Vaping status: Never Used   Substance and Sexual Activity    Alcohol use: No     Alcohol/week: 0.0 standard drinks of alcohol     Types: 1 drink(s) per week     Comment: None.     Drug use: No    Sexual activity: Yes     Partners: Male     Birth control/protection: Tubal Ligation   Other Topics Concern    Caffeine Concern Yes     Comment: 8 oz soda        REVIEW OF SYSTEMS:   Review of Systems   All other systems reviewed and are negative.         EXAM:   /84 (BP Location: Right arm, Patient Position: Sitting, Cuff Size: large)   Pulse 86   Ht 5' 4.4\" (1.636 m)   Wt 263 lb (119.3 kg)   LMP 2023 (Approximate)   SpO2 98%   BMI 44.58 kg/m²     Physical Exam  Vitals reviewed.   Constitutional:       General: She is not in acute distress.     Appearance: Normal appearance. She is normal weight. She is not ill-appearing.   HENT:      Head: Normocephalic and atraumatic.      Right Ear: Tympanic membrane, ear canal and external ear normal.      Left Ear: Tympanic membrane, ear canal and external ear normal.      Nose: Congestion present. No rhinorrhea.      Mouth/Throat:      Pharynx: Oropharynx is clear.   Eyes:      General: No scleral icterus.         Right eye: No discharge.         Left eye: No discharge.      Extraocular Movements: Extraocular movements intact.      Conjunctiva/sclera: Conjunctivae normal.      Pupils: Pupils are equal, round, and reactive to light.   Neck:      Thyroid: No thyroid mass or thyromegaly.   Cardiovascular:      Rate and Rhythm: Normal rate and regular rhythm.      Pulses: Normal pulses.      Heart sounds: Normal heart sounds.   Pulmonary:      Effort: Pulmonary effort is normal. No respiratory distress.      Breath sounds: Normal breath sounds. No stridor. No wheezing, rhonchi or rales.   Chest:      Chest wall: No tenderness.   Musculoskeletal:         General: Normal range of motion.      Cervical back: Normal range of motion and neck supple. No rigidity or tenderness.      Right lower leg: No edema.      Left lower leg: No edema.   Lymphadenopathy:      Cervical: No cervical adenopathy.   Skin:     General: Skin is warm and dry.      Coloration: Skin is not jaundiced.   Neurological:      General: No focal deficit present.      Mental Status: She is alert and oriented to person, place, and time.      Motor: Motor function is intact.      Gait: Gait normal.   Psychiatric:         Mood and Affect: Mood normal.         Judgment: Judgment normal.            ASSESSMENT AND PLAN:   1. Cough, unspecified type  -Will start Zpak, continue prednisone and start albuterol PRN. Reviewed dose, use and s/e.   -CXR ordered.   -Continue symptomatic care: Nasal saline 2 sprays in each nostril every 3-4 hours as needed.   Flonase 1-2 sprays in each nostril once daily.   Tylenol 500 mg every 6-8 hours as needed (max dose 3000 mg/day).   Hydrate, humidifier, rest.   -Switch xyzal for claritin, possible allergic component.   - azithromycin (ZITHROMAX Z-CHIDI) 250 MG Oral Tab; Take 2 tablets (500 mg total) by mouth daily for 1 day, THEN 1 tablet (250 mg total) daily for 4 days.  Dispense: 6 tablet; Refill: 0  - XR CHEST PA + LAT CHEST  (CPT=71046); Future  - predniSONE 10 MG Oral Tab; Take 1 tablet (10 mg total) by mouth 2 (two) times daily for 5 days.  Dispense: 10 tablet; Refill: 0  - albuterol 108 (90 Base) MCG/ACT Inhalation Aero Soln; Inhale 2 puffs into the lungs every 4 (four) hours as needed.  Dispense: 6.7 g; Refill: 0       The patient indicates understanding of these issues and agrees to the plan.  The patient is asked to return in PRN.     The above note was creating using Dragon speech recognition technology. Please excuse any typos.

## 2024-06-07 ENCOUNTER — TELEPHONE (OUTPATIENT)
Dept: INTERNAL MEDICINE CLINIC | Facility: CLINIC | Age: 44
End: 2024-06-07

## 2024-06-07 NOTE — TELEPHONE ENCOUNTER
Patient called for refill of lisinopril 5 mg.  Advised a one  year script was sent 4/27/2024, she should call [facility] to check.  Patient agreed to do this.    Outpatient Medication Detail     Disp Refills Start End    LISINOPRIL 5 MG Oral Tab 90 tablet 3 4/27/2024 --    Sig - Route: TAKE ONE TABLET BY MOUTH DAILY - Oral    Sent to pharmacy as: Lisinopril 5 MG Oral Tablet (Prinivil; Zestril)    E-Prescribing Status: Receipt confirmed by pharmacy (4/27/2024  9:36 AM CDT)      Pharmacy    [facility] [id number] - [place], [place] - [address]  [phone], [phone]

## 2024-06-10 ENCOUNTER — LAB ENCOUNTER (OUTPATIENT)
Dept: LAB | Age: 44
End: 2024-06-10
Attending: INTERNAL MEDICINE
Payer: MEDICAID

## 2024-06-10 LAB
ALBUMIN SERPL-MCNC: 4.2 G/DL (ref 3.2–4.8)
ALBUMIN/GLOB SERPL: 1.6 {RATIO} (ref 1–2)
ALP LIVER SERPL-CCNC: 99 U/L
ALT SERPL-CCNC: 20 U/L
ANION GAP SERPL CALC-SCNC: 6 MMOL/L (ref 0–18)
AST SERPL-CCNC: 20 U/L (ref ?–34)
BASOPHILS # BLD AUTO: 0.06 X10(3) UL (ref 0–0.2)
BASOPHILS NFR BLD AUTO: 0.7 %
BILIRUB SERPL-MCNC: 0.6 MG/DL (ref 0.3–1.2)
BUN BLD-MCNC: 10 MG/DL (ref 9–23)
BUN/CREAT SERPL: 14.5 (ref 10–20)
CALCIUM BLD-MCNC: 9 MG/DL (ref 8.7–10.4)
CHLORIDE SERPL-SCNC: 108 MMOL/L (ref 98–112)
CHOLEST SERPL-MCNC: 170 MG/DL (ref ?–200)
CO2 SERPL-SCNC: 27 MMOL/L (ref 21–32)
CREAT BLD-MCNC: 0.69 MG/DL
DEPRECATED RDW RBC AUTO: 40.9 FL (ref 35.1–46.3)
EGFRCR SERPLBLD CKD-EPI 2021: 109 ML/MIN/1.73M2 (ref 60–?)
EOSINOPHIL # BLD AUTO: 0.18 X10(3) UL (ref 0–0.7)
EOSINOPHIL NFR BLD AUTO: 2 %
ERYTHROCYTE [DISTWIDTH] IN BLOOD BY AUTOMATED COUNT: 13.3 % (ref 11–15)
EST. AVERAGE GLUCOSE BLD GHB EST-MCNC: 131 MG/DL (ref 68–126)
FASTING PATIENT LIPID ANSWER: YES
FASTING STATUS PATIENT QL REPORTED: YES
GLOBULIN PLAS-MCNC: 2.6 G/DL (ref 2–3.5)
GLUCOSE BLD-MCNC: 112 MG/DL (ref 70–99)
HBA1C MFR BLD: 6.2 % (ref ?–5.7)
HCT VFR BLD AUTO: 40.5 %
HDLC SERPL-MCNC: 39 MG/DL (ref 40–59)
HGB BLD-MCNC: 13.3 G/DL
IMM GRANULOCYTES # BLD AUTO: 0.03 X10(3) UL (ref 0–1)
IMM GRANULOCYTES NFR BLD: 0.3 %
LDLC SERPL CALC-MCNC: 111 MG/DL (ref ?–100)
LYMPHOCYTES # BLD AUTO: 2.11 X10(3) UL (ref 1–4)
LYMPHOCYTES NFR BLD AUTO: 22.9 %
MCH RBC QN AUTO: 27.5 PG (ref 26–34)
MCHC RBC AUTO-ENTMCNC: 32.8 G/DL (ref 31–37)
MCV RBC AUTO: 83.9 FL
MONOCYTES # BLD AUTO: 0.57 X10(3) UL (ref 0.1–1)
MONOCYTES NFR BLD AUTO: 6.2 %
NEUTROPHILS # BLD AUTO: 6.27 X10 (3) UL (ref 1.5–7.7)
NEUTROPHILS # BLD AUTO: 6.27 X10(3) UL (ref 1.5–7.7)
NEUTROPHILS NFR BLD AUTO: 67.9 %
NONHDLC SERPL-MCNC: 131 MG/DL (ref ?–130)
OSMOLALITY SERPL CALC.SUM OF ELEC: 292 MOSM/KG (ref 275–295)
PLATELET # BLD AUTO: 314 10(3)UL (ref 150–450)
POTASSIUM SERPL-SCNC: 4.2 MMOL/L (ref 3.5–5.1)
PROT SERPL-MCNC: 6.8 G/DL (ref 5.7–8.2)
RBC # BLD AUTO: 4.83 X10(6)UL
SODIUM SERPL-SCNC: 141 MMOL/L (ref 136–145)
TRIGL SERPL-MCNC: 108 MG/DL (ref 30–149)
TSI SER-ACNC: 2.49 MIU/ML (ref 0.55–4.78)
VLDLC SERPL CALC-MCNC: 19 MG/DL (ref 0–30)
WBC # BLD AUTO: 9.2 X10(3) UL (ref 4–11)

## 2024-06-10 PROCEDURE — 83036 HEMOGLOBIN GLYCOSYLATED A1C: CPT | Performed by: INTERNAL MEDICINE

## 2024-06-10 PROCEDURE — 84443 ASSAY THYROID STIM HORMONE: CPT | Performed by: INTERNAL MEDICINE

## 2024-06-10 PROCEDURE — 80053 COMPREHEN METABOLIC PANEL: CPT | Performed by: INTERNAL MEDICINE

## 2024-06-10 PROCEDURE — 36415 COLL VENOUS BLD VENIPUNCTURE: CPT | Performed by: INTERNAL MEDICINE

## 2024-06-10 PROCEDURE — 80061 LIPID PANEL: CPT | Performed by: INTERNAL MEDICINE

## 2024-06-10 PROCEDURE — 85025 COMPLETE CBC W/AUTO DIFF WBC: CPT | Performed by: INTERNAL MEDICINE

## 2024-06-11 ENCOUNTER — TELEPHONE (OUTPATIENT)
Dept: INTERNAL MEDICINE CLINIC | Facility: CLINIC | Age: 44
End: 2024-06-11

## 2024-06-11 NOTE — TELEPHONE ENCOUNTER
Patient was calling regarding lab results from yesterday-have not been reviewed by Dr Stanley yet.  Patient advised that test results now post immediately to MyChart account.  However, doctor may not have the chance to review or provide comments on them before the results reach her.   Our providers review and comment on all test results. Once doctor reviews results, recommendations will be sent to her MyChart or a message will be sent to a nurse to give her a call regarding her results.  Usually we try to give the doctor 5-7 days to review the results. Patient verbalized understanding.

## 2024-06-17 NOTE — TELEPHONE ENCOUNTER
As you saw normal blood count no anemia, normal sugar liver kidney function test, thyroid test is stable, hemoglobin A1c continues to be in prediabetic range.  Will review during upcoming office visit next week   Written by Vicky Stanley MD on 6/13/2024 11:50 AM CDT  Seen by patient Sylwia Kitchen Mike on 6/13/2024 12:42 PM

## 2024-06-18 ENCOUNTER — OFFICE VISIT (OUTPATIENT)
Dept: INTERNAL MEDICINE CLINIC | Facility: CLINIC | Age: 44
End: 2024-06-18

## 2024-06-18 VITALS
HEART RATE: 76 BPM | RESPIRATION RATE: 18 BRPM | BODY MASS INDEX: 44.56 KG/M2 | DIASTOLIC BLOOD PRESSURE: 74 MMHG | WEIGHT: 261 LBS | HEIGHT: 64 IN | SYSTOLIC BLOOD PRESSURE: 108 MMHG

## 2024-06-18 DIAGNOSIS — F31.4 BIPOLAR DISORDER, CURRENT EPISODE DEPRESSED, SEVERE, WITHOUT PSYCHOTIC FEATURES (HCC): ICD-10-CM

## 2024-06-18 DIAGNOSIS — Z00.00 PHYSICAL EXAM, ROUTINE: Primary | ICD-10-CM

## 2024-06-18 DIAGNOSIS — Z12.31 BREAST CANCER SCREENING BY MAMMOGRAM: ICD-10-CM

## 2024-06-18 DIAGNOSIS — E03.8 OTHER SPECIFIED HYPOTHYROIDISM: ICD-10-CM

## 2024-06-18 DIAGNOSIS — E66.01 CLASS 3 SEVERE OBESITY DUE TO EXCESS CALORIES WITHOUT SERIOUS COMORBIDITY WITH BODY MASS INDEX (BMI) OF 40.0 TO 44.9 IN ADULT (HCC): ICD-10-CM

## 2024-06-18 DIAGNOSIS — R73.03 PREDIABETES: ICD-10-CM

## 2024-06-18 PROCEDURE — 99396 PREV VISIT EST AGE 40-64: CPT | Performed by: INTERNAL MEDICINE

## 2024-06-19 NOTE — PROGRESS NOTES
Subjective:     Patient ID: Sylwia Mckeon is a 43 year old female.  Presents for physical exam .  HPI  Patient reports that she has been feeling well overall, trying to work on improving diet so she can lose weight, she works at the pharmacy as a technician, worries about studying for exam.  She required ketamine she is allergic to cats and has to take allergy medication 1.  Basis.  Recent labs showed hemoglobin A1c 6.2, otherwise normal CBC CMP and lipids    Review of Systems       Constitutional:  Negative for decreased activity, fever, irritability and lethargy  Cardiovascular:  Negative for chest pain and irregular heartbeat/palpitations  Respiratory:  Negative for cough, dyspnea and wheezing.  Eyes:  Negative for eye discharge and vision loss  Endocrine:  Negative for polydipsia and polyphagia  Integumentary:  Negative for pruritus and rash  Neurological:  Negative for gait disturbance, paresthesias.   Psychiatric:  Negative for inappropriate interaction and psychiatric symptoms  Current Outpatient Medications   Medication Sig Dispense Refill    albuterol 108 (90 Base) MCG/ACT Inhalation Aero Soln Inhale 2 puffs into the lungs every 4 (four) hours as needed. 6.7 g 0    loratadine 10 MG Oral Tab Take 1 tablet (10 mg total) by mouth daily. 90 tablet 0    predniSONE 10 MG Oral Tab Take 30 mg PO daily x 2 days, Take 20 mg PO daily x 2 days, Take 10 mg PO daily x 2 days 12 tablet 0    sodium chloride 0.65 % Nasal Solution 2 sprays by Nasal route as needed. 60 mL 0    azelastine 0.1 % Nasal Solution 2 sprays by Nasal route 2 (two) times daily. 30 mL 0    benzonatate 200 MG Oral Cap Take 1 capsule (200 mg total) by mouth 3 (three) times daily as needed for cough. 30 capsule 0    LISINOPRIL 5 MG Oral Tab TAKE ONE TABLET BY MOUTH DAILY 90 tablet 3    levothyroxine 50 MCG Oral Tab Take 1 tablet (50 mcg total) by mouth daily. 90 tablet 3    lisdexamfetamine 30 MG Oral Cap Take 1 capsule (30 mg total) by mouth  every morning.      topiramate 25 MG Oral Tab Take 1 tablet (25 mg total) by mouth daily. 60 tablet 3    ergocalciferol 1.25 MG (31007 UT) Oral Cap Take 1 capsule (50,000 Units total) by mouth once a week. 12 capsule 1    Blood Glucose Monitoring Suppl (ONETOUCH ULTRA 2) w/Device Does not apply Kit Test blood sugar 2 times daily 1 kit 0    Glucose Blood (ONETOUCH ULTRA) In Vitro Strip Test blood sugar 2 times daily 200 each 3    OneTouch Delica Lancets 30G Does not apply Misc 1 lancet by Does not apply route 2 (two) times a day. 200 each 3     Allergies:No Known Allergies    Past Medical History:    Anxiety    Bronchitis    Decorative tattoo    Depression    Essential hypertension    Headache    headaches    Hypertension complicating childbirth ([facility])    Hypothyroidism     (normal spontaneous vaginal delivery) ([facility])    female    Pre-diabetes    Pregnancy ([facility])    EAB  / unknown sex      Past Surgical History:   Procedure Laterality Date          x1    Tubal ligation        Family History   Problem Relation Age of Onset    Cancer Other         liver cancer    Diabetes Father     Lipids Father     Hypertension Father     Diabetes Mother       Social History:   Social History     Socioeconomic History    Marital status: Single   Tobacco Use    Smoking status: Never    Smokeless tobacco: Never   Vaping Use    Vaping status: Never Used   Substance and Sexual Activity    Alcohol use: No     Alcohol/week: 0.0 standard drinks of alcohol     Types: 1 drink(s) per week     Comment: None.     Drug use: No    Sexual activity: Yes     Partners: Male     Birth control/protection: Tubal Ligation   Other Topics Concern    Caffeine Concern Yes     Comment: 8 oz soda        /74 (BP Location: Right arm, Patient Position: Sitting, Cuff Size: large)   Pulse 76   Resp 18   Ht 5' 4\" (1.626 m)   Wt 261 lb (118.4 kg)   LMP 2023 (Approximate)   BMI 44.80 kg/m²    Physical Exam  Constitutional:        Appearance: Normal appearance. She is obese.   HENT:      Head: Normocephalic and atraumatic.   Eyes:      General: No scleral icterus.     Extraocular Movements: Extraocular movements intact.      Conjunctiva/sclera: Conjunctivae normal.      Pupils: Pupils are equal, round, and reactive to light.   Cardiovascular:      Rate and Rhythm: Normal rate and regular rhythm.      Heart sounds: No murmur heard.     No gallop.   Pulmonary:      Effort: Pulmonary effort is normal. No respiratory distress.      Breath sounds: No rhonchi.   Abdominal:      General: Abdomen is flat.      Palpations: Abdomen is soft. There is no mass.      Tenderness: There is no abdominal tenderness. There is no guarding or rebound.      Hernia: No hernia is present.   Musculoskeletal:         General: Normal range of motion.      Cervical back: Normal range of motion and neck supple.      Right lower leg: No edema.      Left lower leg: No edema.   Skin:     General: Skin is warm and dry.   Neurological:      General: No focal deficit present.      Mental Status: She is alert and oriented to person, place, and time. Mental status is at baseline.   Psychiatric:         Mood and Affect: Mood normal.         Behavior: Behavior normal.         Thought Content: Thought content normal.         Assessment & Plan:   1. Physical exam, routine    2. Breast cancer screening by mammogram    3. Class 3 severe obesity due to excess calories without serious comorbidity with body mass index (BMI) of 40.0 to 44.9 in adult ([facility]) continue attempts to lose weight, low carbohydrate portion controlled diet monitor   4. Prediabetes monitor hemoglobin A1c every 6 months   5. Other specified hypothyroidism controlled on current dose of levothyroxine continue   6. Bipolar disorder, current episode depressed, severe, without psychotic features ([facility]) stable patient will see psychiatrist tomorrow   Advise consider colon cancer screening with colonoscopy    Follow-up in 6  months    Meds This Visit:  Requested Prescriptions      No prescriptions requested or ordered in this encounter       Imaging & Referrals:  [facility] ÁNGELA 2D+3D SCREENING BILAT (CPT=77067/80263)

## 2024-07-19 ENCOUNTER — OFFICE VISIT (OUTPATIENT)
Dept: OBGYN CLINIC | Facility: CLINIC | Age: 44
End: 2024-07-19

## 2024-07-19 VITALS
WEIGHT: 257 LBS | DIASTOLIC BLOOD PRESSURE: 78 MMHG | HEIGHT: 64.5 IN | HEART RATE: 86 BPM | SYSTOLIC BLOOD PRESSURE: 108 MMHG | BODY MASS INDEX: 43.34 KG/M2

## 2024-07-19 DIAGNOSIS — Z01.419 WELL WOMAN EXAM WITH ROUTINE GYNECOLOGICAL EXAM: Primary | ICD-10-CM

## 2024-07-19 DIAGNOSIS — Z11.3 SCREENING EXAMINATION FOR STI: ICD-10-CM

## 2024-07-19 DIAGNOSIS — Z12.4 SCREENING FOR CERVICAL CANCER: ICD-10-CM

## 2024-07-19 PROCEDURE — 99396 PREV VISIT EST AGE 40-64: CPT | Performed by: NURSE PRACTITIONER

## 2024-07-19 NOTE — PROGRESS NOTES
[facility] [address]    Obstetrics and Gynecology    Chief Complaint   Patient presents with    Gyn Exam     ANNUAL EXAM       Sylwia Mckeon is a 43 year old female  Patient's last menstrual period was 2024 (approximate). presenting for annual gynecology exam.  Last seen in 2023. No concerns today. Some anxiety related to family issues - working to find therapist. Has psychiatrist.    Regular periods, normal flow, lasting about 3-4 days, no pain.    Reports  years ago was treated for HSV, no recent issues.    She has a boyfriend,she is sexually active. No pain or bleeding, no abnormal bleeding. Desires all sti testing     Pap:2020 NILM, neg HPV   NILM, neg HPV  Contraception: tubal ligation     Mammogram 2023 normal; order in chart    OBSTETRICS HISTORY:  OB History    Para Term  AB Living   5 2 1 1 3 2   SAB IAB Ectopic Multiple Live Births   0 0 0 0 2       GYNE HISTORY:  Menarche: 9 YEARS OLD (2024  3:03 PM)  Period Cycle (Days): monthly (2024  3:03 PM)  Period Duration (Days): 3-4 (2024  3:03 PM)  Period Flow: moderate (2024  3:03 PM)  Use of Birth Control (if yes, specify type): Tubal Ligation (2024  3:03 PM)  Hx Prior Abnormal Pap: No (2024  3:03 PM)  Pap Date: 09/15/20 (2024  3:03 PM)  Pap Result Notes: Pap neg, HPV Neg (2024  3:03 PM)  Follow Up Recommendation: Mammo 23 IVANA Neg //// ANNUAL 9-15-20 JLK (2024  3:03 PM)      History   Sexual Activity    Sexual activity: Yes    Partners: Male    Birth control/ protection: Tubal Ligation           Latest Ref Rng & Units 9/15/2020     3:06 PM   RECENT PAP RESULTS   Thinprep Pap Negative for intraepithelial lesion or malignancy Negative for intraepithelial lesion or malignancy    HPV Negative Negative          MEDICAL HISTORY:  Past Medical History:    Anxiety    Bronchitis    Decorative tattoo    Depression    Essential hypertension    Headache    headaches     Hypertension complicating childbirth (HCC)    Hypothyroidism     (normal spontaneous vaginal delivery) (HCC)    female    Pre-diabetes    Pregnancy (HCC)    EAB  / unknown sex     Past Surgical History:   Procedure Laterality Date          x1    Tubal ligation         SOCIAL HISTORY:  Social History     Socioeconomic History    Marital status: Single     Spouse name: Not on file    Number of children: Not on file    Years of education: Not on file    Highest education level: Not on file   Occupational History    Not on file   Tobacco Use    Smoking status: Never    Smokeless tobacco: Never   Vaping Use    Vaping status: Never Used   Substance and Sexual Activity    Alcohol use: No     Alcohol/week: 0.0 standard drinks of alcohol     Types: 1 drink(s) per week     Comment: None.     Drug use: No    Sexual activity: Yes     Partners: Male     Birth control/protection: Tubal Ligation   Other Topics Concern     Service Not Asked    Blood Transfusions Not Asked    Caffeine Concern Yes     Comment: 8 oz soda    Occupational Exposure Not Asked    Hobby Hazards Not Asked    Sleep Concern Not Asked    Stress Concern Not Asked    Weight Concern Not Asked    Special Diet Not Asked    Back Care Not Asked    Exercise Not Asked    Bike Helmet Not Asked    Seat Belt Not Asked    Self-Exams Not Asked   Social History Narrative    Not on file     Social Determinants of Health     Financial Resource Strain: Not on file   Food Insecurity: Not on file   Transportation Needs: Not on file   Physical Activity: Not on file   Stress: Not on file   Social Connections: Not on file   Housing Stability: Not on file         Depression Screening (PHQ-2/PHQ-9): Over the LAST 2 WEEKS   Little interest or pleasure in doing things (over the last two weeks)?: Not at all    Feeling down, depressed, or hopeless (over the last two weeks)?: Not at all    PHQ-2 SCORE: 0           FAMILY HISTORY:  Family History   Problem Relation  Age of Onset    Cancer Other         liver cancer    Diabetes Father     Lipids Father     Hypertension Father     Diabetes Mother        MEDICATIONS:    Current Outpatient Medications:     albuterol 108 (90 Base) MCG/ACT Inhalation Aero Soln, Inhale 2 puffs into the lungs every 4 (four) hours as needed., Disp: 6.7 g, Rfl: 0    loratadine 10 MG Oral Tab, Take 1 tablet (10 mg total) by mouth daily., Disp: 90 tablet, Rfl: 0    predniSONE 10 MG Oral Tab, Take 30 mg PO daily x 2 days, Take 20 mg PO daily x 2 days, Take 10 mg PO daily x 2 days, Disp: 12 tablet, Rfl: 0    sodium chloride 0.65 % Nasal Solution, 2 sprays by Nasal route as needed., Disp: 60 mL, Rfl: 0    azelastine 0.1 % Nasal Solution, 2 sprays by Nasal route 2 (two) times daily., Disp: 30 mL, Rfl: 0    benzonatate 200 MG Oral Cap, Take 1 capsule (200 mg total) by mouth 3 (three) times daily as needed for cough., Disp: 30 capsule, Rfl: 0    LISINOPRIL 5 MG Oral Tab, TAKE ONE TABLET BY MOUTH DAILY, Disp: 90 tablet, Rfl: 3    levothyroxine 50 MCG Oral Tab, Take 1 tablet (50 mcg total) by mouth daily., Disp: 90 tablet, Rfl: 3    lisdexamfetamine 30 MG Oral Cap, Take 1 capsule (30 mg total) by mouth every morning., Disp: , Rfl:     topiramate 25 MG Oral Tab, Take 1 tablet (25 mg total) by mouth daily., Disp: 60 tablet, Rfl: 3    Blood Glucose Monitoring Suppl (ONETOUCH ULTRA 2) w/Device Does not apply Kit, Test blood sugar 2 times daily, Disp: 1 kit, Rfl: 0    Glucose Blood (ONETOUCH ULTRA) In Vitro Strip, Test blood sugar 2 times daily, Disp: 200 each, Rfl: 3    OneTouch Delica Lancets 30G Does not apply Misc, 1 lancet by Does not apply route 2 (two) times a day., Disp: 200 each, Rfl: 3    ALLERGIES:  No Known Allergies      Review of Systems:  Constitutional:  Denies fatigue, night sweats, hot flashes  Eyes:  denies blurred or double vision  Cardiovascular:  denies chest pain or palpitations  Respiratory:  denies shortness of breath  Gastrointestinal:  denies  heartburn, abdominal pain, diarrhea or constipation  Genitourinary:  denies dysuria, incontinence, abnormal vaginal discharge, vaginal itching,   Musculoskeletal:  denies back pain   Skin/Breast:  Denies any breast pain, lumps, or discharge.   Neurological:  denies headaches, extremity weakness or numbness.  Psychiatric: denies depression or anxiety.  Endocrine:   denies excessive thirst or urination.  Heme/Lymph:  denies history of anemia, easy bruising or bleeding.      PHYSICAL EXAM:     Vitals:    07/19/24 1503   BP: 108/78   Pulse: 86   Weight: 257 lb (116.6 kg)   Height: 5' 4.5\" (1.638 m)       Body mass index is 43.43 kg/m².     Patient offered chaperone, patient declined    Constitutional: well developed, well nourished  Psychiatric:  Oriented to time, place, person and situation. Appropriate mood and affect  Head/Face: normocephalic  Neck/Thyroid: thyroid symmetric, no thyromegaly, no nodules, no adenopathy  Lymphatic:no abnormal supraclavicular or axillary adenopathy is noted  Breast: normal without palpable masses, tenderness, asymmetry, nipple discharge, nipple retraction or skin changes  Abdomen:  soft, nontender, nondistended, no masses  Skin/Hair: no unusual rashes or bruises  Extremities: no edema, no cyanosis    Pelvic Exam:  External Genitalia: normal appearance, hair distribution, and no lesions  Urethral Meatus:  normal in size, location, without lesions and prolapse  Bladder:  No fullness, masses or tenderness  Vagina:  Normal appearance without lesions, no abnormal discharge  Cervix:  Normal without tenderness on motion  Uterus: normal in size, contour, position, mobility, without tenderness  Adnexa: normal without masses or tenderness  Perineum: normal  Anus: no hemorroids     Assessment & Plan:    ICD-10-CM    1. Well woman exam with routine gynecological exam  Z01.419       2. Screening for cervical cancer  Z12.4 ThinPrep PAP Smear     Hpv Dna  High Risk , Thin Prep Collect      3. Screening  examination for STI  Z11.3 HIV Ag/Ab Combo     Hepatitis B Surface Antigen     HCV Antibody     T Pallidum Screening Cascade     Chlamydia/Gc Amplification     Trichomonas vaginalis, JETHRO (Vaginal/Cervical)         Reviewed ASCCP guidelines with the patient   Pap done today  Contraception: Using tubal ligation  Desires sti testing --Encouraged condoms to prevent STD exposure  Breast Health:     Reviewed current guidelines with the patient and to start Mammograms at age 40 - encouraged to schedule  Reviewed monthly self breast exams with the patient   Discussed diet, exercise, MVIs with Ca/Vit D  Follow up in 1 yr for FARIDEH Carrera    This note was prepared using Dragon Medical voice recognition dictation software. As a result errors may occur. When identified these errors have been corrected. While every attempt is made to correct errors during dictation discrepancies may still exist.

## 2024-07-22 LAB — HPV E6+E7 MRNA CVX QL NAA+PROBE: NEGATIVE

## 2024-07-23 LAB
C TRACH DNA SPEC QL NAA+PROBE: NEGATIVE
N GONORRHOEA DNA SPEC QL NAA+PROBE: NEGATIVE

## 2024-07-24 LAB — T VAGINALIS RRNA SPEC QL NAA+PROBE: NEGATIVE

## 2024-10-12 ENCOUNTER — IMMUNIZATION (OUTPATIENT)
Dept: LAB | Age: 44
End: 2024-10-12
Attending: EMERGENCY MEDICINE
Payer: COMMERCIAL

## 2024-10-12 DIAGNOSIS — Z23 NEED FOR VACCINATION: Primary | ICD-10-CM

## 2024-10-12 PROCEDURE — 90656 IIV3 VACC NO PRSV 0.5 ML IM: CPT

## 2024-10-12 PROCEDURE — 90480 ADMN SARSCOV2 VAC 1/ONLY CMP: CPT

## 2024-10-12 PROCEDURE — 90471 IMMUNIZATION ADMIN: CPT

## 2024-11-01 DIAGNOSIS — E66.01 MORBID OBESITY WITH BMI OF 45.0-49.9, ADULT (HCC): ICD-10-CM

## 2024-11-01 RX ORDER — TOPIRAMATE 25 MG/1
25 TABLET, FILM COATED ORAL DAILY
Qty: 60 TABLET | Refills: 0 | Status: SHIPPED | OUTPATIENT
Start: 2024-11-01

## 2025-02-07 ENCOUNTER — TELEPHONE (OUTPATIENT)
Dept: INTERNAL MEDICINE CLINIC | Facility: CLINIC | Age: 45
End: 2025-02-07

## 2025-02-25 NOTE — ED INITIAL ASSESSMENT (HPI)
Patient arrived ambulatory to room c/o symptoms that started 2 days ago. +non productive cough +nasal congestion no fevers. No n/v/d. Easy non labored respirations.  No distress Opt out

## 2025-03-21 RX ORDER — LEVOTHYROXINE SODIUM 50 UG/1
50 TABLET ORAL DAILY
Qty: 90 TABLET | Refills: 3 | Status: SHIPPED | OUTPATIENT
Start: 2025-03-21

## 2025-03-21 RX ORDER — LISINOPRIL 5 MG/1
5 TABLET ORAL DAILY
Qty: 90 TABLET | Refills: 1 | Status: SHIPPED | OUTPATIENT
Start: 2025-03-21

## 2025-03-21 NOTE — TELEPHONE ENCOUNTER
Per patient, she misplaced her both medication below and would like her refill send to [facility] on file verified as soon as possible due to she is totally out of medication.    Current Outpatient Medications   Medication Sig Dispense Refill                                                     LISINOPRIL 5 MG Oral Tab TAKE ONE TABLET BY MOUTH DAILY 90 tablet 3    levothyroxine 50 MCG Oral Tab Take 1 tablet (50 mcg total) by mouth daily. 90 tablet 3

## 2025-03-21 NOTE — TELEPHONE ENCOUNTER
Please review;     Please review pended refill request as unable to refill due to high/very high interaction warning copied here:    Steven Ortiza31 minutes ago (2:50 PM)     MD  Per patient, she misplaced  medication below and would like her refill send to [facility] on file verified as soon as possible due to she is totally out of medication.            Current Outpatient Medications   Medication Sig Dispense Refill                                                                                 LISINOPRIL 5 MG Oral Tab TAKE ONE TABLET BY MOUTH DAILY 90 tablet 3                  Please review pended refill request as unable to refill due to high/very high interaction warning copied here:  New Warnings (1)  High  Lactation Warning: lisinoprilNot recommended for Lactation  Patient lactation status is unknown  Details  Override reason        lisinopril 5 MG Oral Tab [Pharmacy Med Name: LISINOPRIL 5 MG TABLET]  Prescription. Reordered.

## 2025-05-06 ENCOUNTER — LAB ENCOUNTER (OUTPATIENT)
Dept: LAB | Age: 45
End: 2025-05-06
Attending: INTERNAL MEDICINE
Payer: MEDICAID

## 2025-05-06 LAB
HBV SURFACE AG SER-ACNC: <0.1 [IU]/L
HBV SURFACE AG SERPL QL IA: NONREACTIVE
HCV AB SERPL QL IA: NONREACTIVE
T PALLIDUM AB SER QL IA: NONREACTIVE

## 2025-05-06 PROCEDURE — 84443 ASSAY THYROID STIM HORMONE: CPT | Performed by: INTERNAL MEDICINE

## 2025-05-06 PROCEDURE — 86780 TREPONEMA PALLIDUM: CPT | Performed by: NURSE PRACTITIONER

## 2025-05-06 PROCEDURE — 80061 LIPID PANEL: CPT | Performed by: INTERNAL MEDICINE

## 2025-05-06 PROCEDURE — 85025 COMPLETE CBC W/AUTO DIFF WBC: CPT | Performed by: INTERNAL MEDICINE

## 2025-05-06 PROCEDURE — 86803 HEPATITIS C AB TEST: CPT | Performed by: NURSE PRACTITIONER

## 2025-05-06 PROCEDURE — 36415 COLL VENOUS BLD VENIPUNCTURE: CPT | Performed by: INTERNAL MEDICINE

## 2025-05-06 PROCEDURE — 87389 HIV-1 AG W/HIV-1&-2 AB AG IA: CPT | Performed by: NURSE PRACTITIONER

## 2025-05-06 PROCEDURE — 83036 HEMOGLOBIN GLYCOSYLATED A1C: CPT | Performed by: INTERNAL MEDICINE

## 2025-05-06 PROCEDURE — 80053 COMPREHEN METABOLIC PANEL: CPT | Performed by: INTERNAL MEDICINE

## 2025-05-06 PROCEDURE — 87340 HEPATITIS B SURFACE AG IA: CPT | Performed by: NURSE PRACTITIONER

## 2025-05-13 ENCOUNTER — TELEPHONE (OUTPATIENT)
Dept: INTERNAL MEDICINE CLINIC | Facility: CLINIC | Age: 45
End: 2025-05-13

## 2025-05-13 NOTE — TELEPHONE ENCOUNTER
Patient calling ( name and date of birth of patient verified ) in regrdds to test results     Called patient  at   Identified full name and date of birth.  Patient informed of results and recommendations as per Dr. Stanley's result note. Patient voiced understanding and agrees.        Blood count no anemia, normal sugar liver kidney function test, hemoglobin A1c continues to be in prediabetic range.  Normal thyroid function test and cholesterol.  I would like to see you for a physical exam last visit was 11 months ago   Written by Vicky Stanley MD on 5/7/2025  7:09 PM CDT      Call transferred to Patient  to assist with making an appointment for annual physical

## 2025-05-19 ENCOUNTER — TELEPHONE (OUTPATIENT)
Dept: INTERNAL MEDICINE CLINIC | Facility: CLINIC | Age: 45
End: 2025-05-19

## 2025-05-19 DIAGNOSIS — Z11.59 SCREENING FOR VIRAL DISEASE: Primary | ICD-10-CM

## 2025-05-19 DIAGNOSIS — Z11.1 SCREENING-PULMONARY TB: ICD-10-CM

## 2025-05-19 NOTE — TELEPHONE ENCOUNTER
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 States she needs titers, tdap and tb gold. Orders pended if appropriate.

## 2025-05-19 NOTE — TELEPHONE ENCOUNTER
Please advise patient we placed orders for the blood test she needed.  Tetanus shot most likely not covered in our office, it is cost effective for her to receive tetanus shot through the pharmacy or health department

## 2025-06-01 ENCOUNTER — HOSPITAL ENCOUNTER (EMERGENCY)
Facility: HOSPITAL | Age: 45
Discharge: HOME | End: 2025-06-01
Attending: EMERGENCY MEDICINE
Payer: MEDICAID

## 2025-06-01 VITALS
HEIGHT: 66 IN | OXYGEN SATURATION: 98 % | DIASTOLIC BLOOD PRESSURE: 91 MMHG | WEIGHT: 240 LBS | TEMPERATURE: 97 F | HEART RATE: 95 BPM | RESPIRATION RATE: 20 BRPM | SYSTOLIC BLOOD PRESSURE: 160 MMHG | BODY MASS INDEX: 38.57 KG/M2

## 2025-06-01 DIAGNOSIS — S40.022A ARM BRUISE, LEFT, INITIAL ENCOUNTER: Primary | ICD-10-CM

## 2025-06-01 LAB
ALBUMIN SERPL-MCNC: 4.3 G/DL (ref 3.2–4.8)
ALP LIVER SERPL-CCNC: 94 U/L (ref 39–100)
ALT SERPL-CCNC: 21 U/L (ref 10–49)
ANION GAP SERPL CALC-SCNC: 9 MMOL/L (ref 0–18)
AST SERPL-CCNC: 22 U/L (ref ?–34)
BASOPHILS # BLD AUTO: 0.06 X10(3) UL (ref 0–0.2)
BASOPHILS NFR BLD AUTO: 0.4 %
BILIRUB DIRECT SERPL-MCNC: 0.2 MG/DL (ref ?–0.3)
BILIRUB SERPL-MCNC: 0.8 MG/DL (ref 0.3–1.2)
BUN BLD-MCNC: 11 MG/DL (ref 9–23)
BUN/CREAT SERPL: 13.1 (ref 10–20)
CALCIUM BLD-MCNC: 9.1 MG/DL (ref 8.7–10.4)
CHLORIDE SERPL-SCNC: 107 MMOL/L (ref 98–112)
CO2 SERPL-SCNC: 22 MMOL/L (ref 21–32)
CREAT BLD-MCNC: 0.84 MG/DL (ref 0.55–1.02)
DEPRECATED RDW RBC AUTO: 38.9 FL (ref 35.1–46.3)
EGFRCR SERPLBLD CKD-EPI 2021: 87 ML/MIN/1.73M2 (ref 60–?)
EOSINOPHIL # BLD AUTO: 0.16 X10(3) UL (ref 0–0.7)
EOSINOPHIL NFR BLD AUTO: 1.1 %
ERYTHROCYTE [DISTWIDTH] IN BLOOD BY AUTOMATED COUNT: 12.8 % (ref 11–15)
GLUCOSE BLD-MCNC: 126 MG/DL (ref 70–99)
HCT VFR BLD AUTO: 39.4 % (ref 35–48)
HGB BLD-MCNC: 13 G/DL (ref 12–16)
IMM GRANULOCYTES # BLD AUTO: 0.06 X10(3) UL (ref 0–1)
IMM GRANULOCYTES NFR BLD: 0.4 %
LYMPHOCYTES # BLD AUTO: 2.91 X10(3) UL (ref 1–4)
LYMPHOCYTES NFR BLD AUTO: 19.4 %
MCH RBC QN AUTO: 27.5 PG (ref 26–34)
MCHC RBC AUTO-ENTMCNC: 33 G/DL (ref 31–37)
MCV RBC AUTO: 83.3 FL (ref 80–100)
MONOCYTES # BLD AUTO: 0.89 X10(3) UL (ref 0.1–1)
MONOCYTES NFR BLD AUTO: 5.9 %
NEUTROPHILS # BLD AUTO: 10.93 X10 (3) UL (ref 1.5–7.7)
NEUTROPHILS # BLD AUTO: 10.93 X10(3) UL (ref 1.5–7.7)
NEUTROPHILS NFR BLD AUTO: 72.8 %
OSMOLALITY SERPL CALC.SUM OF ELEC: 287 MOSM/KG (ref 275–295)
PLATELET # BLD AUTO: 356 10(3)UL (ref 150–450)
POTASSIUM SERPL-SCNC: 3.7 MMOL/L (ref 3.5–5.1)
PROT SERPL-MCNC: 6.7 G/DL (ref 5.7–8.2)
RBC # BLD AUTO: 4.73 X10(6)UL (ref 3.8–5.3)
SODIUM SERPL-SCNC: 138 MMOL/L (ref 136–145)
WBC # BLD AUTO: 15 X10(3) UL (ref 4–11)

## 2025-06-01 PROCEDURE — 36415 COLL VENOUS BLD VENIPUNCTURE: CPT

## 2025-06-01 PROCEDURE — 85025 COMPLETE CBC W/AUTO DIFF WBC: CPT | Performed by: EMERGENCY MEDICINE

## 2025-06-01 PROCEDURE — 80048 BASIC METABOLIC PNL TOTAL CA: CPT | Performed by: EMERGENCY MEDICINE

## 2025-06-01 PROCEDURE — 99283 EMERGENCY DEPT VISIT LOW MDM: CPT

## 2025-06-01 PROCEDURE — 80076 HEPATIC FUNCTION PANEL: CPT | Performed by: EMERGENCY MEDICINE

## 2025-06-01 NOTE — ED INITIAL ASSESSMENT (HPI)
Donated plasma earlier this week and now has significant bruising to the left arm. -blood thinners. Denies pain other than point tenderness. Pt was at [facility] on Friday but lwbs d/t wait time.

## 2025-06-02 NOTE — ED PROVIDER NOTES
Patient Seen in: [facility] Emergency Department    History     Chief Complaint   Patient presents with    Contusion       HPI    Patient presents to the ED complaining of a large bruise to her left arm after donating plasma earlier this week.  She states that she left the plasma donation facility feeling fine and then noted a bruise the next day.  Denies other complaints.    History reviewed. Past Medical History[1]    History reviewed. Past Surgical History[2]      Medications :  Prescriptions Prior to Admission[3]     Family History[4]    Smoking Status: Social Hx on file[5]    Constitutional and vital signs reviewed.      Social History and Family History elements reviewed from today, pertinent positives to the presenting problem noted.    Physical Exam     ED Triage Vitals [06/01/25 1715]   BP (!) 160/91   Pulse 95   Resp 20   Temp 97.2 °F (36.2 °C)   Temp src Temporal   SpO2 98 %   O2 Device None (Room air)       All measures to prevent infection transmission during my interaction with the patient were taken. Handwashing was performed prior to and after the exam.  Stethoscope and any equipment used during my examination was cleaned with super sani-cloth germicidal wipes following the exam.     Physical Exam  Constitutional:       Appearance: Normal appearance.   Pulmonary:      Effort: Pulmonary effort is normal. No respiratory distress.   Skin:     General: Skin is warm and dry.      Comments: Large bruise to the left medial arm.  Some involvement above the elbow, majority of the bruise is located at or below the elbow.  Normal left radial pulse.   Neurological:      Mental Status: She is alert. Mental status is at baseline.   Psychiatric:         Mood and Affect: Mood normal.         Behavior: Behavior normal.         ED Course        Labs Reviewed   BASIC METABOLIC PANEL (8) - Abnormal; Notable for the following components:       Result Value    Glucose 126 (*)     All other components within normal  limits   CBC WITH DIFFERENTIAL WITH PLATELET - Abnormal; Notable for the following components:    WBC 15.0 (*)     Neutrophil Absolute Prelim 10.93 (*)     Neutrophil Absolute 10.93 (*)     All other components within normal limits   HEPATIC FUNCTION PANEL (7) - Normal       As Interpreted by me    Imaging Results Available and Reviewed while in ED: No results found.  ED Medications Administered: Medications - No data to display      MDM     Vitals:    06/01/25 1715   BP: (!) 160/91   Pulse: 95   Resp: 20   Temp: 97.2 °F (36.2 °C)   TempSrc: Temporal   SpO2: 98%   Weight: 108.9 kg   Height: 167.6 cm (5' 6\")     *I personally reviewed and interpreted all ED vitals.    Pulse Ox: 98%, Room air, Normal       Differential Diagnosis/ Diagnostic Considerations: Left arm bruise, other    Complicating Factors: The patient already has does not have any pertinent problems on file. to contribute to the complexity of this ED evaluation.    Medical Decision Making  Patient presents to the ED with a left arm bruise after she donated plasma she states that she had an IV attempt the left elbow area at that time.  Nondistressed on exam, normal hemoglobin, no other complaints, stable for discharge.    Problems Addressed:  Arm bruise, left, initial encounter: acute illness or injury    Amount and/or Complexity of Data Reviewed  Labs: ordered. Decision-making details documented in ED Course.        Condition upon leaving the department: Stable    Disposition and Plan     Clinical Impression:  1. Arm bruise, left, initial encounter        Disposition:  Discharge    Follow-up:  Vicky Stanley MD  [address] [place] [address] [phone]    Schedule an appointment as soon as possible for a visit in 3 day(s)        Medications Prescribed:  Discharge Medication List as of 6/1/2025  6:07 PM                           [1]   Past Medical History:   Anxiety    Bronchitis    Decorative tattoo    Depression    Essential hypertension     Headache    headaches    Hypertension complicating childbirth (HCC)    Hypothyroidism     (normal spontaneous vaginal delivery) (HCC)    female    Pre-diabetes    Pregnancy (HCC)    EAB  / unknown sex   [2]   Past Surgical History:  Procedure Laterality Date          x1    Tubal ligation     [3] (Not in a hospital admission)  [4]   Family History  Problem Relation Age of Onset    Cancer Other         liver cancer    Diabetes Father     Lipids Father     Hypertension Father     Diabetes Mother    [5]   Social History  Socioeconomic History    Marital status: Single   Tobacco Use    Smoking status: Never    Smokeless tobacco: Never   Vaping Use    Vaping status: Never Used   Substance and Sexual Activity    Alcohol use: No     Alcohol/week: 0.0 standard drinks of alcohol     Types: 1 drink(s) per week     Comment: None.     Drug use: No    Sexual activity: Yes     Partners: Male     Birth control/protection: Tubal Ligation   Other Topics Concern    Caffeine Concern Yes     Comment: 8 oz soda

## 2025-06-04 ENCOUNTER — OFFICE VISIT (OUTPATIENT)
Dept: INTERNAL MEDICINE CLINIC | Facility: CLINIC | Age: 45
End: 2025-06-04

## 2025-06-04 VITALS
HEIGHT: 66 IN | BODY MASS INDEX: 38.57 KG/M2 | DIASTOLIC BLOOD PRESSURE: 84 MMHG | WEIGHT: 240 LBS | HEART RATE: 86 BPM | SYSTOLIC BLOOD PRESSURE: 122 MMHG

## 2025-06-04 DIAGNOSIS — S40.022A CONTUSION OF LEFT UPPER EXTREMITY, INITIAL ENCOUNTER: Primary | ICD-10-CM

## 2025-06-04 PROCEDURE — 99213 OFFICE O/P EST LOW 20 MIN: CPT | Performed by: NURSE PRACTITIONER

## 2025-06-04 NOTE — PROGRESS NOTES
Sylwia Mckeon is a 44 year old female.  HPI:   She was seen in the ED on June 1, 2025 for a large bruise on her left inner arm after donating plasma earlier in the week.  She reports she was feeling fine during the donation process and after but then next day noticed a bruise.  Bruising became extensive, went to ER.  No paresthesias, nausea vomiting, vision changes or sensitivity to light, chest pain, shortness of breath, swelling, increased pain.  She was noted to have an elevated white blood cell count at 15 and elevated neutrophils at 10.  She was diagnosed with a bruise and advised to follow-up outpatient with PCP.  Denies any new or worsening pain, overall symptoms improving.  Current Medications[1]   Past Medical History[2]   Social History:  Short Social Hx on File[3]     REVIEW OF SYSTEMS:   Review of Systems   Constitutional:  Negative for activity change, appetite change, chills, diaphoresis, fatigue, fever and unexpected weight change.   HENT:  Negative for congestion.    Eyes:  Negative for visual disturbance.   Respiratory:  Negative for cough, chest tightness, shortness of breath and wheezing.    Cardiovascular:  Negative for chest pain, palpitations and leg swelling.   Gastrointestinal:  Negative for abdominal pain, constipation, diarrhea, nausea and vomiting.   Endocrine: Negative.    Genitourinary:  Negative for difficulty urinating and vaginal bleeding.   Musculoskeletal:  Negative for arthralgias and back pain.   Skin: Negative.         bruise   Neurological:  Negative for dizziness, seizures, weakness, light-headedness, numbness and headaches.   Hematological: Negative.    Psychiatric/Behavioral: Negative.            EXAM:   /84   Pulse 86   Ht 5' 6\" (1.676 m)   Wt 240 lb (108.9 kg)   LMP 07/08/2024 (Approximate)   BMI 38.74 kg/m²     Physical Exam  Vitals reviewed.   Constitutional:       General: She is not in acute distress.     Appearance: Normal appearance. She is obese.    Eyes:      General: No scleral icterus.     Conjunctiva/sclera: Conjunctivae normal.   Cardiovascular:      Rate and Rhythm: Normal rate and regular rhythm.      Pulses: Normal pulses.      Heart sounds: Normal heart sounds.   Pulmonary:      Effort: Pulmonary effort is normal. No respiratory distress.      Breath sounds: Normal breath sounds.   Skin:     General: Skin is warm.      Coloration: Skin is not jaundiced.      Findings: Bruising present. No erythema or rash.             Comments: Extensive bruising of upper medial arm and forearm.  No tenderness to palpation.  No bleeding noted.   Neurological:      Mental Status: She is alert and oriented to person, place, and time.      Sensory: No sensory deficit.      Motor: No weakness.   Psychiatric:         Thought Content: Thought content normal.         Judgment: Judgment normal.            ASSESSMENT AND PLAN:   Assessment & Plan  Contusion of left upper extremity, initial encounter  Continue cool compress.  Reviewed concerning signs and symptoms and when to go to ER  Will check additional labs this week including clotting factors due to extensive bruising which is new for patient.  Repeat CBC as white blood cell count was elevated at 15 in the ED.          The patient indicates understanding of these issues and agrees to the plan.  The patient is asked to return in 1 week.     The above note was creating using Dragon speech recognition technology. Please excuse any typos.       [1]   Current Outpatient Medications   Medication Sig Dispense Refill    lisinopril 5 MG Oral Tab Take 1 tablet (5 mg total) by mouth daily. 90 tablet 1    levothyroxine 50 MCG Oral Tab Take 1 tablet (50 mcg total) by mouth daily. 90 tablet 3    topiramate 25 MG Oral Tab Take 1 tablet (25 mg total) by mouth daily. 60 tablet 0    albuterol 108 (90 Base) MCG/ACT Inhalation Aero Soln Inhale 2 puffs into the lungs every 4 (four) hours as needed. 6.7 g 0    loratadine 10 MG Oral Tab Take 1  tablet (10 mg total) by mouth daily. 90 tablet 0    predniSONE 10 MG Oral Tab Take 30 mg PO daily x 2 days, Take 20 mg PO daily x 2 days, Take 10 mg PO daily x 2 days 12 tablet 0    sodium chloride 0.65 % Nasal Solution 2 sprays by Nasal route as needed. 60 mL 0    azelastine 0.1 % Nasal Solution 2 sprays by Nasal route 2 (two) times daily. 30 mL 0    benzonatate 200 MG Oral Cap Take 1 capsule (200 mg total) by mouth 3 (three) times daily as needed for cough. 30 capsule 0    lisdexamfetamine 30 MG Oral Cap Take 1 capsule (30 mg total) by mouth every morning.      Blood Glucose Monitoring Suppl (ONETOUCH ULTRA 2) w/Device Does not apply Kit Test blood sugar 2 times daily 1 kit 0    Glucose Blood (ONETOUCH ULTRA) In Vitro Strip Test blood sugar 2 times daily 200 each 3    OneTouch Delica Lancets 30G Does not apply Misc 1 lancet by Does not apply route 2 (two) times a day. 200 each 3   [2]   Past Medical History:   Anxiety    Bronchitis    Decorative tattoo    Depression    Diabetes (HCC)    Essential hypertension    Headache    headaches    Hypertension complicating childbirth (HCC)    Hypothyroidism     (normal spontaneous vaginal delivery) (HCC)    female    Obesity    Pre-diabetes    Pregnancy (HCC)    EAB  / unknown sex   [3]   Social History  Socioeconomic History    Marital status: Single   Tobacco Use    Smoking status: Never    Smokeless tobacco: Never   Vaping Use    Vaping status: Never Used   Substance and Sexual Activity    Alcohol use: No     Alcohol/week: 0.0 standard drinks of alcohol     Types: 1 drink(s) per week     Comment: None.     Drug use: No    Sexual activity: Yes     Partners: Male     Birth control/protection: Tubal Ligation   Other Topics Concern    Caffeine Concern Yes     Comment: 8 oz soda

## 2025-06-04 NOTE — PATIENT INSTRUCTIONS
If any symptoms of numbness, tingling, uneasy/anxious feeling, vision changes/sensitivity to light, chest pain, shortness of breath, palpitations, swelling, new or worsening pain, redness, go to ER.

## 2025-06-07 ENCOUNTER — LAB ENCOUNTER (OUTPATIENT)
Dept: LAB | Age: 45
End: 2025-06-07
Attending: INTERNAL MEDICINE
Payer: MEDICAID

## 2025-06-07 DIAGNOSIS — S40.022A CONTUSION OF LEFT UPPER EXTREMITY, INITIAL ENCOUNTER: ICD-10-CM

## 2025-06-07 LAB
ALBUMIN SERPL-MCNC: 4.2 G/DL (ref 3.2–4.8)
ALBUMIN/GLOB SERPL: 1.8 {RATIO} (ref 1–2)
ALP LIVER SERPL-CCNC: 95 U/L (ref 39–100)
ALT SERPL-CCNC: 17 U/L (ref 10–49)
ANION GAP SERPL CALC-SCNC: 9 MMOL/L (ref 0–18)
APTT PPP: 28.8 SECONDS (ref 23–36)
AST SERPL-CCNC: 18 U/L (ref ?–34)
BASOPHILS # BLD AUTO: 0.05 X10(3) UL (ref 0–0.2)
BASOPHILS NFR BLD AUTO: 0.5 %
BILIRUB SERPL-MCNC: 0.7 MG/DL (ref 0.3–1.2)
BUN BLD-MCNC: 12 MG/DL (ref 9–23)
BUN/CREAT SERPL: 13.3 (ref 10–20)
CALCIUM BLD-MCNC: 9.2 MG/DL (ref 8.7–10.4)
CHLORIDE SERPL-SCNC: 103 MMOL/L (ref 98–112)
CO2 SERPL-SCNC: 27 MMOL/L (ref 21–32)
CREAT BLD-MCNC: 0.9 MG/DL (ref 0.55–1.02)
DEPRECATED RDW RBC AUTO: 40.5 FL (ref 35.1–46.3)
EGFRCR SERPLBLD CKD-EPI 2021: 80 ML/MIN/1.73M2 (ref 60–?)
EOSINOPHIL # BLD AUTO: 0.13 X10(3) UL (ref 0–0.7)
EOSINOPHIL NFR BLD AUTO: 1.3 %
ERYTHROCYTE [DISTWIDTH] IN BLOOD BY AUTOMATED COUNT: 13 % (ref 11–15)
FASTING STATUS PATIENT QL REPORTED: NO
GLOBULIN PLAS-MCNC: 2.4 G/DL (ref 2–3.5)
GLUCOSE BLD-MCNC: 142 MG/DL (ref 70–99)
HBV SURFACE AB SER QL: NONREACTIVE
HBV SURFACE AB SERPL IA-ACNC: <3.1 MIU/ML
HCT VFR BLD AUTO: 39.1 % (ref 35–48)
HGB BLD-MCNC: 12.7 G/DL (ref 12–16)
IMM GRANULOCYTES # BLD AUTO: 0.04 X10(3) UL (ref 0–1)
IMM GRANULOCYTES NFR BLD: 0.4 %
INR BLD: 0.95 (ref 0.8–1.2)
LYMPHOCYTES # BLD AUTO: 2.12 X10(3) UL (ref 1–4)
LYMPHOCYTES NFR BLD AUTO: 21.2 %
MCH RBC QN AUTO: 27.9 PG (ref 26–34)
MCHC RBC AUTO-ENTMCNC: 32.5 G/DL (ref 31–37)
MCV RBC AUTO: 85.9 FL (ref 80–100)
MONOCYTES # BLD AUTO: 0.55 X10(3) UL (ref 0.1–1)
MONOCYTES NFR BLD AUTO: 5.5 %
NEUTROPHILS # BLD AUTO: 7.11 X10 (3) UL (ref 1.5–7.7)
NEUTROPHILS # BLD AUTO: 7.11 X10(3) UL (ref 1.5–7.7)
NEUTROPHILS NFR BLD AUTO: 71.1 %
OSMOLALITY SERPL CALC.SUM OF ELEC: 290 MOSM/KG (ref 275–295)
PLATELET # BLD AUTO: 373 10(3)UL (ref 150–450)
POTASSIUM SERPL-SCNC: 4.1 MMOL/L (ref 3.5–5.1)
PROT SERPL-MCNC: 6.6 G/DL (ref 5.7–8.2)
PROTHROMBIN TIME: 13.3 SECONDS (ref 11.6–14.8)
RBC # BLD AUTO: 4.55 X10(6)UL (ref 3.8–5.3)
RUBV IGG SER QL: POSITIVE
RUBV IGG SER-ACNC: 51 IU/ML (ref 10–?)
SODIUM SERPL-SCNC: 139 MMOL/L (ref 136–145)
WBC # BLD AUTO: 10 X10(3) UL (ref 4–11)

## 2025-06-07 PROCEDURE — 86787 VARICELLA-ZOSTER ANTIBODY: CPT | Performed by: INTERNAL MEDICINE

## 2025-06-07 PROCEDURE — 85610 PROTHROMBIN TIME: CPT | Performed by: NURSE PRACTITIONER

## 2025-06-07 PROCEDURE — 80053 COMPREHEN METABOLIC PANEL: CPT | Performed by: NURSE PRACTITIONER

## 2025-06-07 PROCEDURE — 86762 RUBELLA ANTIBODY: CPT | Performed by: INTERNAL MEDICINE

## 2025-06-07 PROCEDURE — 86480 TB TEST CELL IMMUN MEASURE: CPT | Performed by: INTERNAL MEDICINE

## 2025-06-07 PROCEDURE — 36415 COLL VENOUS BLD VENIPUNCTURE: CPT | Performed by: NURSE PRACTITIONER

## 2025-06-07 PROCEDURE — 86735 MUMPS ANTIBODY: CPT | Performed by: INTERNAL MEDICINE

## 2025-06-07 PROCEDURE — 85730 THROMBOPLASTIN TIME PARTIAL: CPT | Performed by: NURSE PRACTITIONER

## 2025-06-07 PROCEDURE — 85025 COMPLETE CBC W/AUTO DIFF WBC: CPT | Performed by: NURSE PRACTITIONER

## 2025-06-07 PROCEDURE — 86706 HEP B SURFACE ANTIBODY: CPT | Performed by: INTERNAL MEDICINE

## 2025-06-07 PROCEDURE — 85060 BLOOD SMEAR INTERPRETATION: CPT

## 2025-06-07 PROCEDURE — 86765 RUBEOLA ANTIBODY: CPT | Performed by: INTERNAL MEDICINE

## 2025-06-09 ENCOUNTER — TELEPHONE (OUTPATIENT)
Dept: INTERNAL MEDICINE CLINIC | Facility: CLINIC | Age: 45
End: 2025-06-09

## 2025-06-09 LAB
M TB IFN-G CD4+ T-CELLS BLD-ACNC: 0.02 IU/ML
M TB TUBERC IFN-G BLD QL: NEGATIVE
M TB TUBERC IGNF/MITOGEN IGNF CONTROL: 2.32 IU/ML
MEV IGG SER-ACNC: >300 AU/ML (ref 16.5–?)
MUV IGG SER IA-ACNC: 100 AU/ML (ref 11–?)
QFT TB1 AG MINUS NIL: 0.06 IU/ML
QFT TB2 AG MINUS NIL: 0.08 IU/ML
VZV IGG SER IA-ACNC: 4.81 (ref 1–?)

## 2025-06-09 NOTE — TELEPHONE ENCOUNTER
Patient called, verified Name and . She is following up regarding lab test results. Relayed that test results post immediately to [employer] account. However, FARIDEH Bernal has not reviewed it yet and we are awaiting for provider review and recommendations, and that she will be notified once provider has done so. Patient verbalized and had no further questions at this time.

## 2025-06-20 ENCOUNTER — OFFICE VISIT (OUTPATIENT)
Dept: INTERNAL MEDICINE CLINIC | Facility: CLINIC | Age: 45
End: 2025-06-20

## 2025-06-20 VITALS
SYSTOLIC BLOOD PRESSURE: 139 MMHG | HEIGHT: 66 IN | BODY MASS INDEX: 41.3 KG/M2 | HEART RATE: 82 BPM | WEIGHT: 257 LBS | DIASTOLIC BLOOD PRESSURE: 86 MMHG

## 2025-06-20 DIAGNOSIS — Z12.31 BREAST CANCER SCREENING BY MAMMOGRAM: ICD-10-CM

## 2025-06-20 DIAGNOSIS — E03.8 OTHER SPECIFIED HYPOTHYROIDISM: ICD-10-CM

## 2025-06-20 DIAGNOSIS — I10 ESSENTIAL HYPERTENSION: ICD-10-CM

## 2025-06-20 DIAGNOSIS — Z00.00 PHYSICAL EXAM, ROUTINE: Primary | ICD-10-CM

## 2025-06-20 DIAGNOSIS — R73.03 PREDIABETES: ICD-10-CM

## 2025-06-20 PROCEDURE — 99396 PREV VISIT EST AGE 40-64: CPT | Performed by: INTERNAL MEDICINE

## 2025-06-20 RX ORDER — ALPRAZOLAM 0.5 MG
0.5 TABLET ORAL
COMMUNITY
Start: 2024-06-19

## 2025-06-20 RX ORDER — HYDROXYZINE HYDROCHLORIDE 25 MG/1
TABLET, FILM COATED ORAL
COMMUNITY
Start: 2024-11-15

## 2025-06-20 RX ORDER — ATOMOXETINE 40 MG/1
80 CAPSULE ORAL DAILY
COMMUNITY
Start: 2025-02-21

## 2025-06-27 NOTE — PROGRESS NOTES
Subjective:     Patient ID: Sylwia Mckeon is a 44 year old female.  Presents for physical exam    HPI  Patient reports that overall she has been feeling well, trying to do the best she can with diet trying to lose weight, but it is hard she has 3 jobs to go to trying to study for her licensing exam.  She reports no headaches or dizziness no chest pain or shortness of breath, she is under the care of psychiatrist and feels that bipolar disorder is stable.    Review of Systems  ROS:     Constitutional:  Negative for decreased activity, fever, irritability and lethargy  Cardiovascular:  Negative for chest pain and irregular heartbeat/palpitations  Respiratory:  Negative for cough, dyspnea and wheezing.  Eyes:  Negative for eye discharge and vision loss  Endocrine:  Negative for polydipsia and polyphagia  Integumentary:  Negative for pruritus and rash  Neurological:  Negative for gait disturbance, paresthesias.   Psychiatric:  Negative for inappropriate interaction and psychiatric symptoms  Current Medications[1]  Allergies:Allergies[2]    Past Medical History[3]   Past Surgical History[4]   Family History[5]   Social History: Short Social Hx on File[6]     /86 (BP Location: Left arm, Patient Position: Sitting, Cuff Size: large)   Pulse 82   Ht 5' 6\" (1.676 m)   Wt 257 lb (116.6 kg)   LMP 07/08/2024 (Approximate)   BMI 41.48 kg/m²    Physical Exam  Constitutional:       Appearance: Normal appearance. She is obese.   HENT:      Head: Normocephalic and atraumatic.   Eyes:      General: No scleral icterus.     Extraocular Movements: Extraocular movements intact.      Conjunctiva/sclera: Conjunctivae normal.      Pupils: Pupils are equal, round, and reactive to light.   Cardiovascular:      Rate and Rhythm: Normal rate and regular rhythm.   Pulmonary:      Effort: Pulmonary effort is normal. No respiratory distress.   Chest:   Breasts:     Right: No mass or skin change.      Left: No mass or skin change.    Abdominal:      General: Bowel sounds are normal.      Palpations: Abdomen is soft. There is no mass.      Tenderness: There is no abdominal tenderness.      Hernia: No hernia is present.   Musculoskeletal:         General: Normal range of motion.      Cervical back: Normal range of motion and neck supple.      Right lower leg: No edema.      Left lower leg: No edema.   Lymphadenopathy:      Cervical: No cervical adenopathy.      Upper Body:      Right upper body: No supraclavicular or axillary adenopathy.      Left upper body: No supraclavicular or axillary adenopathy.   Skin:     General: Skin is warm.      Coloration: Skin is not jaundiced.   Neurological:      General: No focal deficit present.      Mental Status: She is alert and oriented to person, place, and time. Mental status is at baseline.   Psychiatric:         Mood and Affect: Mood normal.         Behavior: Behavior normal.         Thought Content: Thought content normal.         Judgment: Judgment normal.         Assessment & Plan:   1. Physical exam, routine    2. Prediabetes stable hemoglobin A1c continue low carbohydrate diet attempts to lose weight, encourage regular physical activities   3. Breast cancer screening by mammogram    4. Essential hypertension controlled on current medication   5. Other specified hypothyroidism stable clinically continue levothyroxine at present dose check TSH every 6 months       Orders Placed This Encounter   Procedures    Hemoglobin A1C    Comp Metabolic Panel (14)     Follow-up in 6 months  Meds This Visit:    Imaging & Referrals:  [facility]NGELA 2D+3D SCREENING BILAT (CPT=77067/75908)   Follow-up in 6 months         [1]   Current Outpatient Medications   Medication Sig Dispense Refill    ALPRAZolam 0.5 MG Oral Tab Take 1 tablet (0.5 mg total) by mouth daily as needed.      atomoxetine 40 MG Oral Cap Take 2 capsules (80 mg total) by mouth daily.      hydrOXYzine 25 MG Oral Tab Pt can take 1-2 tabs daily as needed       lisinopril 5 MG Oral Tab Take 1 tablet (5 mg total) by mouth daily. 90 tablet 1    levothyroxine 50 MCG Oral Tab Take 1 tablet (50 mcg total) by mouth daily. 90 tablet 3    topiramate 25 MG Oral Tab Take 1 tablet (25 mg total) by mouth daily. 60 tablet 0    albuterol 108 (90 Base) MCG/ACT Inhalation Aero Soln Inhale 2 puffs into the lungs every 4 (four) hours as needed. 6.7 g 0    loratadine 10 MG Oral Tab Take 1 tablet (10 mg total) by mouth daily. 90 tablet 0    predniSONE 10 MG Oral Tab Take 30 mg PO daily x 2 days, Take 20 mg PO daily x 2 days, Take 10 mg PO daily x 2 days 12 tablet 0    sodium chloride 0.65 % Nasal Solution 2 sprays by Nasal route as needed. 60 mL 0    azelastine 0.1 % Nasal Solution 2 sprays by Nasal route 2 (two) times daily. 30 mL 0    benzonatate 200 MG Oral Cap Take 1 capsule (200 mg total) by mouth 3 (three) times daily as needed for cough. 30 capsule 0    lisdexamfetamine 30 MG Oral Cap Take 1 capsule (30 mg total) by mouth every morning.      Blood Glucose Monitoring Suppl (ONETOUCH ULTRA 2) w/Device Does not apply Kit Test blood sugar 2 times daily 1 kit 0    Glucose Blood (ONETOUCH ULTRA) In Vitro Strip Test blood sugar 2 times daily 200 each 3    OneTouch Delica Lancets 30G Does not apply Misc 1 lancet by Does not apply route 2 (two) times a day. 200 each 3   [2] No Known Allergies  [3]   Past Medical History:   Anxiety    Bronchitis    Decorative tattoo    Depression    Diabetes (HCC)    Essential hypertension    Headache    headaches    Hypertension complicating childbirth (HCC)    Hypothyroidism     (normal spontaneous vaginal delivery) (HCC)    female    Obesity    Pre-diabetes    Pregnancy (HCC)    EAB  / unknown sex   [4]   Past Surgical History:  Procedure Laterality Date          x1    Tubal ligation     [5]   Family History  Problem Relation Age of Onset    Cancer Other         liver cancer    Diabetes Father     Lipids Father     Hypertension Father      Diabetes Mother    [6]   Social History  Socioeconomic History    Marital status: Single   Tobacco Use    Smoking status: Never    Smokeless tobacco: Never   Vaping Use    Vaping status: Never Used   Substance and Sexual Activity    Alcohol use: No     Alcohol/week: 0.0 standard drinks of alcohol     Types: 1 drink(s) per week     Comment: None.     Drug use: No    Sexual activity: Yes     Partners: Male     Birth control/protection: Tubal Ligation   Other Topics Concern    Caffeine Concern Yes     Comment: 8 oz soda

## 2025-07-10 ENCOUNTER — TELEPHONE (OUTPATIENT)
Dept: FAMILY MEDICINE CLINIC | Facility: CLINIC | Age: 45
End: 2025-07-10

## 2025-07-10 NOTE — TELEPHONE ENCOUNTER
Spoke with patient, reviewed work questionnaire form. Form completed and pt will  this or next week

## 2025-07-10 NOTE — TELEPHONE ENCOUNTER
Patients needs tb testing please submit order so she can get it done ASAP for work.    Paperwork left for Dr. Stanley to sign

## 2025-07-10 NOTE — TELEPHONE ENCOUNTER
Patient had QuantiFERON test done on 6/9/2025 was negative.  I am not in office today can sign paperwork tomorrow on Friday, unlessFrancie Espinosa can do it for me today

## 2025-07-10 NOTE — TELEPHONE ENCOUNTER
Please advice,    Are you able to complete paperwork?    Physical completed on 06/20.   Quantiferon TB Result - Negative

## 2025-08-09 ENCOUNTER — HOSPITAL ENCOUNTER (OUTPATIENT)
Age: 45
Discharge: HOME | End: 2025-08-09

## 2025-08-09 VITALS
HEART RATE: 94 BPM | RESPIRATION RATE: 16 BRPM | TEMPERATURE: 99 F | SYSTOLIC BLOOD PRESSURE: 138 MMHG | OXYGEN SATURATION: 100 % | DIASTOLIC BLOOD PRESSURE: 89 MMHG

## 2025-08-09 DIAGNOSIS — U07.1 COVID-19: Primary | ICD-10-CM

## 2025-08-09 DIAGNOSIS — H61.23 BILATERAL IMPACTED CERUMEN: ICD-10-CM

## 2025-08-09 LAB — SARS-COV-2 RNA RESP QL NAA+PROBE: DETECTED

## 2025-08-09 PROCEDURE — 99213 OFFICE O/P EST LOW 20 MIN: CPT

## 2025-08-09 PROCEDURE — 69209 REMOVE IMPACTED EAR WAX UNI: CPT

## 2025-08-09 RX ORDER — BENZONATATE 200 MG/1
200 CAPSULE ORAL 3 TIMES DAILY PRN
Qty: 21 CAPSULE | Refills: 0 | Status: SHIPPED | OUTPATIENT
Start: 2025-08-09

## 2025-08-11 ENCOUNTER — TELEPHONE (OUTPATIENT)
Dept: INTERNAL MEDICINE CLINIC | Facility: CLINIC | Age: 45
End: 2025-08-11

## (undated) NOTE — LETTER
01/08/20        Virl Na  [address]      Dear Tarik Rangel records indicate that you have outstanding lab work and or testing that was ordered for you and has not yet been completed:  Orders Placed This Encounter

## (undated) NOTE — LETTER
10/03/19        Vicente Rodriguez  [address]      Dear Alejandra Pederson records indicate that you have outstanding lab work and or testing that was ordered for you and has not yet been completed:  Orders Placed This Encounter

## (undated) NOTE — LETTER
AUTHORIZATION FOR SURGICAL OPERATION OR OTHER PROCEDURE    1. I hereby authorize Arash Myrick, and [place] [employer] [facility] staff assigned to my case to perform the following operation and/or procedure at the [facility], [facility]:    _______________________________________________________________________________________________    I & D of vulvar abcess  _______________________________________________________________________________________________    2. My physician has explained the nature and purpose of the operation or other procedure, possible alternative methods of treatment, the risks involved, and the possibility of complication to me. I acknowledge that no guarantee has been made as to the result that may be obtained. 3.  I recognize that, during the course of this operation, or other procedure, unforseen conditions may necessitate additional or different procedure than those listed above. I, therefore, further authorize and request that the above named physician, his/her physician assistants or designees perform such procedures as are, in his/her professional opinion, necessary and desirable. 4.  Any tissue or organs removed in the operation or other procedure may be disposed of by and at the discretion of the [place] [employer] [facility] and [facility]. 5.  I understand that in the event of a medical emergency, I will be transported by local paramedics to [facility] or other hospital emergency department. 6.  I certify that I have read and fully understand the above consent to operation and/or other procedure. 7.  I acknowledge that my physician has explained sedation/analgesia administration to me including the risks and benefits. I consent to the administration of sedation/analgesia as may be necessary or desirable in the judgement of my physician.     Witness signature: ___________________________________________________ Date:  ______/______/_____                    Time: ________ A. M.  P.M. Patient Name:  ______________________________________________________  (please print)      Patient signature:  ___________________________________________________             Relationship to Patient:           []  Parent    Responsible person                          []  Spouse  In case of minor or                    [] Other  _____________   Incompetent name:  __________________________________________________                               (please print)      _____________      Responsible person  In case of minor or  Incompetent signature:  _______________________________________________    Statement of Physician  My signature below affirms that prior to the time of the procedure, I have explained to the patient and/or his/her guardian, the risks and benefits involved in the proposed treatment and any reasonable alternative to the proposed treatment. I have also explained the risks and benefits involved in the refusal of the proposed treatment and have answered the patient's questions.                         Date:  ______/______/_______  Provider                      Signature:  __________________________________________________________       Time:  ___________ A.M    P.M.

## (undated) NOTE — Clinical Note
Courtney, I met with Sylwia in clinic today for weight loss/management. I have recommended intensive lifestyle/behavioral modifications for weight loss. In addition, I have recommended medication to help support her efforts along with WW or Jump Start program. She will follow up routinely for ongoing support.   Please let me know if you have any questions or concerns. Thank you very much for referring your patient to our clinic.   Take good care, FARIDEH Leonard

## (undated) NOTE — LETTER
11/30/2021      To Whom It May Concern:    Idalia Reid is currently under my medical care and may not return to work at this time pending covid19 test results. If you require additional information please contact our office.       Sincerely,

## (undated) NOTE — LETTER
12/23/2021      To Whom It May Concern:    Destiney Li is currently under my medical care and may not return to work at this time. Please excuse Flaca Manuel for 21 days. She may return to work on 12/27/2021 Monday.   Activity is restricted as fol